# Patient Record
Sex: FEMALE | Race: WHITE | HISPANIC OR LATINO | URBAN - METROPOLITAN AREA
[De-identification: names, ages, dates, MRNs, and addresses within clinical notes are randomized per-mention and may not be internally consistent; named-entity substitution may affect disease eponyms.]

---

## 2018-01-02 ENCOUNTER — TELEPHONE (OUTPATIENT)
Dept: INTERNAL MEDICINE | Facility: CLINIC | Age: 38
End: 2018-01-02

## 2018-01-02 NOTE — TELEPHONE ENCOUNTER
Pt just hand an annual pap and has some concerns about pap done Feb. 2016 and would like to discuss the results of that again. Please call pt.

## 2018-01-02 NOTE — TELEPHONE ENCOUNTER
I spoke with pt who said that she just rec'd her pap smear results from  and they informed her that she is high risk HPV. Pt was calling to see if her pap smear is 2016 showed anything or if that was something that we screen for. Pt advised that her pap smear was completely normal and because of that, we did not test for HPV. We will test automatically if pap smear comes back abnormal or ASCUS. Pt states that  has started a screening for HPV in women over 30 but I have not heard that this is a requirement. Pt asked to call back if she has any additional questions.

## 2019-06-23 PROCEDURE — 87086 URINE CULTURE/COLONY COUNT: CPT | Performed by: NURSE PRACTITIONER

## 2019-06-23 PROCEDURE — 87186 SC STD MICRODIL/AGAR DIL: CPT | Performed by: NURSE PRACTITIONER

## 2019-10-02 PROCEDURE — 87086 URINE CULTURE/COLONY COUNT: CPT | Performed by: FAMILY MEDICINE

## 2019-10-17 ENCOUNTER — OFFICE VISIT (OUTPATIENT)
Dept: ENDOCRINOLOGY | Facility: CLINIC | Age: 39
End: 2019-10-17

## 2019-10-17 ENCOUNTER — TELEPHONE (OUTPATIENT)
Dept: ENDOCRINOLOGY | Facility: CLINIC | Age: 39
End: 2019-10-17

## 2019-10-17 VITALS
DIASTOLIC BLOOD PRESSURE: 60 MMHG | SYSTOLIC BLOOD PRESSURE: 104 MMHG | BODY MASS INDEX: 20.96 KG/M2 | WEIGHT: 130.4 LBS | OXYGEN SATURATION: 99 % | HEIGHT: 66 IN | HEART RATE: 77 BPM

## 2019-10-17 DIAGNOSIS — E13.9 LADA (LATENT AUTOIMMUNE DIABETES IN ADULTS), MANAGED AS TYPE 1 (HCC): Primary | ICD-10-CM

## 2019-10-17 LAB
GLUCOSE BLDC GLUCOMTR-MCNC: 113 MG/DL (ref 70–130)
HBA1C MFR BLD: 8.1 %
POC CREATININE URINE: 50
POC MICROALBUMIN URINE: 10

## 2019-10-17 PROCEDURE — 82570 ASSAY OF URINE CREATININE: CPT | Performed by: INTERNAL MEDICINE

## 2019-10-17 PROCEDURE — 82947 ASSAY GLUCOSE BLOOD QUANT: CPT | Performed by: INTERNAL MEDICINE

## 2019-10-17 PROCEDURE — 90471 IMMUNIZATION ADMIN: CPT | Performed by: INTERNAL MEDICINE

## 2019-10-17 PROCEDURE — 90686 IIV4 VACC NO PRSV 0.5 ML IM: CPT | Performed by: INTERNAL MEDICINE

## 2019-10-17 PROCEDURE — 83036 HEMOGLOBIN GLYCOSYLATED A1C: CPT | Performed by: INTERNAL MEDICINE

## 2019-10-17 PROCEDURE — 99204 OFFICE O/P NEW MOD 45 MIN: CPT | Performed by: INTERNAL MEDICINE

## 2019-10-17 PROCEDURE — 82044 UR ALBUMIN SEMIQUANTITATIVE: CPT | Performed by: INTERNAL MEDICINE

## 2019-10-17 RX ORDER — INSULIN LISPRO 100 [IU]/ML
2 INJECTION, SOLUTION INTRAVENOUS; SUBCUTANEOUS DAILY
COMMUNITY
Start: 2019-10-03 | End: 2019-10-17

## 2019-10-17 RX ORDER — INSULIN LISPRO 100 [IU]/ML
2 INJECTION, SOLUTION INTRAVENOUS; SUBCUTANEOUS
Status: SHIPPED
Start: 2019-10-17 | End: 2019-10-21 | Stop reason: SDUPTHER

## 2019-10-17 RX ORDER — BLOOD SUGAR DIAGNOSTIC
STRIP MISCELLANEOUS
COMMUNITY
Start: 2019-10-03 | End: 2022-04-22

## 2019-10-17 RX ORDER — FLASH GLUCOSE SENSOR
1 KIT MISCELLANEOUS
Qty: 3 EACH | Refills: 5 | Status: SHIPPED | OUTPATIENT
Start: 2019-10-17 | End: 2021-01-20

## 2019-10-17 NOTE — TELEPHONE ENCOUNTER
Received labs from [facility].  Gave records to Dr. March, whiled she was with the patient.      Requested recent labs from Dr. Crane,  [facility] at [phone].    Will fax to us.      ----- Message from Shanna March MD sent at 10/17/2019 11:22 AM EDT -----  Ms. Escalante,     Could you please contact Dr. Martinez's office and request results of recent labs ? (Drawn ~2 weeks ago) Specifically C-peptide, insulin, YANCY antibodies and any other available labs.    Thank you,     Shanna

## 2019-10-17 NOTE — PROGRESS NOTES
Chief Complaint   Patient presents with   • Establish Care   • Latent Autoimmune Diabetes     referred by Dr. TACO Crane        New patient who is being seen in consultation regarding recently diagnosed diabetes at the request of Shana Martinez MD     HPI   Candace Kinney is a 39 y.o. female who presents to discuss recent diagnosis of diabetes.    Patient has a history of possible LORI.  This was initially diagnosed a few weeks ago after patient presented with 5 days days of increased urination and increased thirst. She started azothioprine and then later presented to [facility] [place] due to concern for UTI at which time she was noted to have glucose of 390 with a corresponding A1c of 8.8..  Patient was initially started on metformin which was discontinued when she followed up with her PCP.  Patient is currently taking lantus 10 units in the afternoon. 2 units short acting insulin if >120 , typically takes 2 doses of short acting insulin daily.  This regimen was last changed 2 weeks ago.  Patient believes that glycemic control is improving.  Patient reports good adherence to medications.  She reports that she has felt better since starting insulin and that her increased thirst and increased urination are improving    Patient reports hypoglycemia awareness with symptoms of shaky if glucose falls below 100.  She does report having symptoms of hypoglycemia despite normal blood glucose but noticed that this is happening less frequently over the last week.  Patient monitors blood glucose three times daily.  Downloaded patient's meter reveals medium glucose 158, range 95-3 18 with no hypoglycemia.  She reports that hyperglycemia frequently occurs when she holds the dose of short acting insulin due to glucose being less than 120 mg.  Patient reports that since her diagnosis she is been trying to eat healthier and limit her simple sugars in her diet.  However she reports that she has not received a lot of information regarding what  diabetic diet entails and requests referral to diabetes education.  Patient does exercise regularly.     Patient has not had recent dilated eye exam.  Patient denies foot related concerns such as numbness, tingling, or pain.  History of dyslipidemia: No  History of renal dysfunction: No    She denies any family history of type 1 diabetes.    Past Medical History:   Diagnosis Date   • Anemia    • Depression    • Female bladder prolapse    • Hyperglycemia    • Lupus (CMS/HCC)    • Migraine      Past Surgical History:   Procedure Laterality Date   • HERNIA REPAIR     • TOOTH EXTRACTION        Family History   Problem Relation Age of Onset   • Arthritis Mother    • Cancer Mother    • Mental illness Sister    • Obesity Sister    • Hypertension Other    • Obesity Other    • Migraines Other    • Mental illness Father    • Migraines Father    • Arthritis Maternal Grandmother       Social History     Socioeconomic History   • Marital status:      Spouse name: Not on file   • Number of children: Not on file   • Years of education: Not on file   • Highest education level: Not on file   Tobacco Use   • Smoking status: Never Smoker   • Smokeless tobacco: Never Used   Substance and Sexual Activity   • Alcohol use: Yes     Alcohol/week: 1.2 oz     Types: 2 Glasses of wine per week     Comment: occasional   • Drug use: No   • Sexual activity: Defer      Allergies   Allergen Reactions   • Nitrofurantoin    • Sulfa Antibiotics       Current Outpatient Medications on File Prior to Visit   Medication Sig Dispense Refill   • drospirenone-ethinyl estradiol (MOE) 3-0.02 MG per tablet Take 1 tablet by mouth Daily.     • Insulin Glargine (BASAGLAR KWIKPEN SC) Inject 10 Units/oz/day under the skin into the appropriate area as directed Daily.     • ONETOUCH VERIO test strip      • [DISCONTINUED] ciprofloxacin (CIPRO) 250 MG tablet Take 1 tablet by mouth 2 (Two) Times a Day. 6 tablet 0   • [DISCONTINUED] Cranberry-Vitamin C-Probiotic  "(AZO CRANBERRY PO) Take  by mouth.     • [DISCONTINUED] HUMALOG KWIKPEN 100 UNIT/ML solution pen-injector Inject 2 Units under the skin into the appropriate area as directed Daily.     • [DISCONTINUED] metFORMIN (GLUCOPHAGE) 500 MG tablet Take 1 tablet by mouth 2 (Two) Times a Day With Meals. 30 tablet 0     No current facility-administered medications on file prior to visit.         Review of Systems   Constitutional: Positive for fatigue. Negative for unexpected weight gain.   HENT: Negative for trouble swallowing and voice change.    Respiratory: Negative for cough and shortness of breath.    Cardiovascular: Negative for chest pain and palpitations.   Gastrointestinal: Positive for constipation. Negative for abdominal pain, diarrhea and nausea.   Endocrine: Positive for polydipsia and polyuria.   Musculoskeletal: Negative for arthralgias and myalgias.   Skin: Positive for rash. Negative for dry skin.   Neurological: Negative for tremors and numbness.   Psychiatric/Behavioral: Negative for sleep disturbance and depressed mood.        Vitals:    10/17/19 1116   BP: 104/60   Pulse: 77   SpO2: 99%   Weight: 59.1 kg (130 lb 6.4 oz)   Height: 167.6 cm (66\")   Body mass index is 21.05 kg/m².     Physical Exam   Constitutional: Vital signs are normal. She appears well-developed and well-nourished. She is cooperative.   HENT:   Head: Normocephalic and atraumatic.   Mouth/Throat: Oropharynx is clear and moist and mucous membranes are normal.   Eyes: Conjunctivae and EOM are normal. Pupils are equal, round, and reactive to light.   Neck: Trachea normal. No thyromegaly present.   Cardiovascular: Normal rate and regular rhythm.   No murmur heard.  Pulmonary/Chest: Effort normal and breath sounds normal. No respiratory distress.   Abdominal: Soft. Bowel sounds are normal. She exhibits no distension. There is no hepatosplenomegaly. There is no tenderness.    Candace had a diabetic foot exam performed today.   During the foot " exam she had a monofilament test performed.    Neurological Sensory Findings -  Unaltered sharp/dull right ankle/foot discrimination and unaltered sharp/dull left ankle/foot discrimination.  Vascular Status -  Her right foot exhibits normal foot vasculature  and no edema. Her left foot exhibits normal foot vasculature  and no edema.  Lymphadenopathy:        Head (right side): No submandibular adenopathy present.        Head (left side): No submandibular adenopathy present.     She has no cervical adenopathy.   Neurological: She is alert. She has normal reflexes.   Skin: Skin is warm, dry and intact.   Patient with discoloration of anterior right shin which blanches    Psychiatric: She has a normal mood and affect. Her behavior is normal.       Labs/Imaging  Islet cell antibody screen positive  C-peptide 1.72, reference range 0.8-3.85  Insulin level 7.8, reference range 2-19.6  Glucose 307  EGFR greater than 60  Hemoglobin A1c 9.0  Glutamic acid decarboxylase 65 42, reference range less than 5    Assessment and Alex Maria was seen today for establish care and latent autoimmune diabetes.    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  -Patient's glucose log reveals morning glucose near goal with some rise in blood glucose over the course of the day.  -Patient to continue Lantus 10 units daily.  -Patient to continue Humalog 2 units 3 times daily.  She was instructed to take this before meals.  -Start Humalog correction scale, 1 unit for every 100 points greater than 200  -Written instructions for medications were provided to the patient.  -Patient inquired about freestyle ramiro system. Discussed that this is not as accurate as glucometer and patient should verify any values <70 or >250 with fingerstick and any time she is not feeling well.   - Hypoglycemia management was reviewed with the  patient.  -Refer for diabetes education.  -Recommendations for daily foot exams and annual eye exams reviewed with the patient.  -Microalbumin completed today  -Consider lipid panel with next labs.   -Monofilament exam completed today with intact sensation.  - Patient to schedule eye exam.  -     POC Glycosylated Hemoglobin (Hb A1C)  -     POCT Glucose  -     POCT microalbumin  -     Flucelvax Quad=>4Years (1195-9145)  -     Ambulatory Referral to Diabetic Education  -     Continuous Blood Gluc Sensor (FREESTYLE RAISSA 14 DAY SENSOR) misc; 1 each Every 14 (Fourteen) Days.  -     HUMALOG KWIKPEN 100 UNIT/ML solution pen-injector; Inject 2 Units under the skin into the appropriate area as directed 3 (Three) Times a Day With Meals.    Skin discoloration  -advised patient to discuss with PCP if not improving or worsening.         Follow up in 3 months. The patient was instructed to contact the clinic with any interval questions or concerns.    Shanna March MD     Please note that portions of this document were completed using a voice recognition program. Efforts were made to edit the dictations, but occasionally words are mis-transcribed.

## 2019-10-21 ENCOUNTER — TELEPHONE (OUTPATIENT)
Dept: INTERNAL MEDICINE | Facility: CLINIC | Age: 39
End: 2019-10-21

## 2019-10-21 DIAGNOSIS — E13.9 LADA (LATENT AUTOIMMUNE DIABETES IN ADULTS), MANAGED AS TYPE 1: ICD-10-CM

## 2019-10-21 RX ORDER — INSULIN LISPRO 100 [IU]/ML
2 INJECTION, SOLUTION INTRAVENOUS; SUBCUTANEOUS
Start: 2019-10-21 | End: 2023-05-12

## 2019-10-21 NOTE — TELEPHONE ENCOUNTER
NYA CALLED IN REGARDS TO LAST VISIT ON Thursday SHE THOUGHT A PRESCRIPTION WAS GOING TO BE CALLED IN AND WHEN SHE WENT TO THE PHARMACY THERE WAS NOT ONE CALLED IN.   PLEASE CALL [phone]

## 2019-10-21 NOTE — TELEPHONE ENCOUNTER
Patient states she went to her [facility] and they did not have her insulin pen.  Looked at the chart, looks like Dr. March sent the script for it on 10/17/19.  However, patient states the pharmacy did not have the script for it.    Re-sending med refill for insulin pen.        Left message for patient to return my call.  Need clarification on what medication patient is talking about, that we did not send to the pharmacy.

## 2019-11-01 ENCOUNTER — HOSPITAL ENCOUNTER (OUTPATIENT)
Dept: DIABETES SERVICES | Facility: HOSPITAL | Age: 39
Setting detail: RECURRING SERIES
Discharge: HOME | End: 2019-11-01

## 2019-11-01 PROCEDURE — G0108 DIAB MANAGE TRN  PER INDIV: HCPCS | Performed by: REGISTERED NURSE

## 2019-11-07 ENCOUNTER — TELEPHONE (OUTPATIENT)
Dept: INTERNAL MEDICINE | Facility: CLINIC | Age: 39
End: 2019-11-07

## 2019-11-07 NOTE — TELEPHONE ENCOUNTER
PLEASE CALL PT SHE NEEDS A LETTER FOR WORK STATING SHE HAS A MEDICAL CONDITION    PLEASE CALL [phone]

## 2019-11-08 DIAGNOSIS — E13.9 LADA (LATENT AUTOIMMUNE DIABETES IN ADULTS), MANAGED AS TYPE 1 (HCC): Primary | ICD-10-CM

## 2019-11-08 NOTE — TELEPHONE ENCOUNTER
Ms. Escalante,     Could you please contact the patient to obtain some further details on what is needed?

## 2019-11-08 NOTE — TELEPHONE ENCOUNTER
"Patient states she needs a note from Dr. March for work.  According to the patient, her boss told her she is not performing at the level she should, and maybe needs to not work or cut her ours from 40 hours a week to 35 hours.   Patient states she feels extremely fatigue and she thinks its due to the insulin and her medical condition - Diabetes.   Patient states that the symptoms that she has (extreme fatigue) is preventing her from performing well as a .    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    The patient's boss suggested that if she had a medical condition that is interfering with her job, the patient should talk to her doctor about getting a note for work.      Patient states there are days when she can barely get out of bed, and she is always extremely tired.  Patient states she started to feel very tired when she started on insulin, October 3, 2019 with Dr. Crane.  Patient's blood sugars were in the three hundreds, at the time of been diagnosed with diabetes.    Besides feeling extremely fatigue, patient states \" the other day I felt icky and my neck was hurting\" but patient states the extreme fatigue is what is bothering her.    Dr. March, please advice.  Thank you.      "

## 2019-11-08 NOTE — TELEPHONE ENCOUNTER
Please contact the patient and inquire about her recent glycemic control. While it is certainly possible to have fatigue associated with hyperglycemia and also to have some changes with the initial initiation of insulin, these should be improving as she has now been on insulin for several weeks and it is difficult for me to reconcile her profound symptoms with the initiation of insulin. I would, however, like to screen her for thyroid dysfunction (thyroid dysfunction can be autoimmune in nature, much like her diabetes, making it reasonable to screen for this, given that low thyroid hormone can cause fatigue). I will place the order for this.  However, if her glucose control is reasonable and her thyroid function is normal, she may need to see her PCP for evaluation of alternative causes of her symptoms.

## 2019-11-11 NOTE — TELEPHONE ENCOUNTER
Informed patient.  Patient voiced understanding.    However, patient states one day last week she got really nauseous out of no where and Saturday as well she got nauseous again. Patient had breakfast and lunch on Saturday and by the time she had dinner it was really hard for her to eat because she was still feeling nauseous.  Patient states that she does not know why she is now feeling nauseas, and so she would like to know what are her symptoms (extreme fatigue and now nausea) is related to, patient states she still think it might be related to her insulin use, but wants to know what Dr. March thinks.    Patient e-mail me her Daily Pattern and Average Glucose (October 13-November 11, 2019).  Patient also e-mail me a copy of recent TSH labs that patient states she had done, ordered by Dr. Crane.    I will forward this information to Dr. March for review.    Dr. March, please advice.  Thank you.

## 2019-11-11 NOTE — TELEPHONE ENCOUNTER
Please let the patient know that I have reviewed her labs and glucose data. Her BG control is quite reasonable and in the absence of the extremes of blood glucose, I cannot explain her recent nausea, especially given that she has been on insulin for over a month and just now developed these symptoms. However, she should certainly rule out hypoglycemia any time she is not feeling well. Likewise, given her normal thyroid function and reasonable blood glucose control I cannot easily explain her fatigue. I would recommend she follow up with her PCP for evaluation of other potential causes of her symptoms.

## 2019-11-11 NOTE — TELEPHONE ENCOUNTER
"Informed patient, patient voiced understanding.    However patient has some questions for Dr. March.    Can the her symptoms - Nausea and extreme fatigue be due to her still adjusting to the insulin.  Patient states that she knows that it has been more than a month that she has been on insulin but \"maybe my body still adjusting to the insulin?\"    Patient also states that her maybe because \"I feel like I am much lower (blood sugars) than what I am actually are, maybe this has something to do with it.\" her symptoms of nausea and extreme fatigue.    Dr. March, please advice.  Thank you.      Call patient's cell number, left message for patient to return my call.  "

## 2019-11-12 NOTE — TELEPHONE ENCOUNTER
Please contact the patient and let her know that I do not believe that her symptoms are related to her insulin or blood sugars adjusting after initiation of insulin. I would have expected any symptoms related to the change in her blood sugar to have resolved by now and this would generally be symptoms of hypoglycemia (shaking, sweating, anxiety, etc.) and not the symptoms she is describing.    Again, I would advise her to see her PCP for further evaluation of her symptoms.

## 2019-12-06 ENCOUNTER — HOSPITAL ENCOUNTER (OUTPATIENT)
Dept: DIABETES SERVICES | Facility: HOSPITAL | Age: 39
Setting detail: RECURRING SERIES
Discharge: HOME | End: 2019-12-06

## 2019-12-10 ENCOUNTER — HOSPITAL ENCOUNTER (OUTPATIENT)
Dept: DIABETES SERVICES | Facility: HOSPITAL | Age: 39
Setting detail: RECURRING SERIES
Discharge: HOME | End: 2019-12-10

## 2020-02-12 ENCOUNTER — HOSPITAL ENCOUNTER (EMERGENCY)
Facility: HOSPITAL | Age: 40
Discharge: HOME | End: 2020-02-12
Attending: EMERGENCY MEDICINE | Admitting: EMERGENCY MEDICINE

## 2020-02-12 ENCOUNTER — APPOINTMENT (OUTPATIENT)
Dept: CT IMAGING | Facility: HOSPITAL | Age: 40
End: 2020-02-12

## 2020-02-12 ENCOUNTER — TRANSCRIBE ORDERS (OUTPATIENT)
Dept: ADMINISTRATIVE | Facility: HOSPITAL | Age: 40
End: 2020-02-12

## 2020-02-12 ENCOUNTER — LAB REQUISITION (OUTPATIENT)
Dept: LAB | Facility: HOSPITAL | Age: 40
End: 2020-02-12

## 2020-02-12 ENCOUNTER — HOSPITAL ENCOUNTER (OUTPATIENT)
Dept: CT IMAGING | Facility: HOSPITAL | Age: 40
Discharge: HOME | End: 2020-02-12

## 2020-02-12 VITALS
OXYGEN SATURATION: 97 % | BODY MASS INDEX: 16.07 KG/M2 | TEMPERATURE: 99.1 F | HEART RATE: 86 BPM | WEIGHT: 100 LBS | DIASTOLIC BLOOD PRESSURE: 61 MMHG | SYSTOLIC BLOOD PRESSURE: 112 MMHG | RESPIRATION RATE: 14 BRPM | HEIGHT: 66 IN

## 2020-02-12 DIAGNOSIS — K92.1 HEMATOCHEZIA: Primary | ICD-10-CM

## 2020-02-12 DIAGNOSIS — R10.84 GENERALIZED ABDOMINAL CRAMPING: Primary | ICD-10-CM

## 2020-02-12 DIAGNOSIS — K62.5 BRBPR (BRIGHT RED BLOOD PER RECTUM): ICD-10-CM

## 2020-02-12 DIAGNOSIS — Z00.00 ROUTINE GENERAL MEDICAL EXAMINATION AT A HEALTH CARE FACILITY: ICD-10-CM

## 2020-02-12 DIAGNOSIS — E10.9 AUTOIMMUNE DIABETES (HCC): ICD-10-CM

## 2020-02-12 LAB
ALBUMIN SERPL-MCNC: 4.2 G/DL (ref 3.5–5.2)
ALBUMIN SERPL-MCNC: 5 G/DL (ref 3.5–5.2)
ALBUMIN/GLOB SERPL: 1.4 G/DL
ALBUMIN/GLOB SERPL: 1.6 G/DL
ALP SERPL-CCNC: 52 U/L (ref 39–117)
ALP SERPL-CCNC: 64 U/L (ref 39–117)
ALT SERPL W P-5'-P-CCNC: 6 U/L (ref 1–33)
ALT SERPL W P-5'-P-CCNC: 9 U/L (ref 1–33)
ANION GAP SERPL CALCULATED.3IONS-SCNC: 14 MMOL/L (ref 5–15)
ANION GAP SERPL CALCULATED.3IONS-SCNC: 16 MMOL/L (ref 5–15)
AST SERPL-CCNC: 11 U/L (ref 1–32)
AST SERPL-CCNC: 12 U/L (ref 1–32)
BACTERIA UR QL AUTO: ABNORMAL /HPF
BASOPHILS # BLD AUTO: 0.04 10*3/MM3 (ref 0–0.2)
BASOPHILS # BLD AUTO: 0.04 10*3/MM3 (ref 0–0.2)
BASOPHILS NFR BLD AUTO: 0.4 % (ref 0–1.5)
BASOPHILS NFR BLD AUTO: 0.4 % (ref 0–1.5)
BILIRUB SERPL-MCNC: 0.4 MG/DL (ref 0.2–1.2)
BILIRUB SERPL-MCNC: 0.5 MG/DL (ref 0.2–1.2)
BILIRUB UR QL STRIP: NEGATIVE
BUN BLD-MCNC: 8 MG/DL (ref 6–20)
BUN BLD-MCNC: 9 MG/DL (ref 6–20)
BUN/CREAT SERPL: 11.8 (ref 7–25)
BUN/CREAT SERPL: 12 (ref 7–25)
CALCIUM SPEC-SCNC: 10.1 MG/DL (ref 8.6–10.5)
CALCIUM SPEC-SCNC: 9.1 MG/DL (ref 8.6–10.5)
CHLORIDE SERPL-SCNC: 101 MMOL/L (ref 98–107)
CHLORIDE SERPL-SCNC: 97 MMOL/L (ref 98–107)
CLARITY UR: ABNORMAL
CO2 SERPL-SCNC: 24 MMOL/L (ref 22–29)
CO2 SERPL-SCNC: 25 MMOL/L (ref 22–29)
COLOR UR: YELLOW
CREAT BLD-MCNC: 0.68 MG/DL (ref 0.57–1)
CREAT BLD-MCNC: 0.75 MG/DL (ref 0.57–1)
DEPRECATED RDW RBC AUTO: 37.1 FL (ref 37–54)
DEPRECATED RDW RBC AUTO: 37.7 FL (ref 37–54)
EOSINOPHIL # BLD AUTO: 0 10*3/MM3 (ref 0–0.4)
EOSINOPHIL # BLD AUTO: 0 10*3/MM3 (ref 0–0.4)
EOSINOPHIL NFR BLD AUTO: 0 % (ref 0.3–6.2)
EOSINOPHIL NFR BLD AUTO: 0 % (ref 0.3–6.2)
ERYTHROCYTE [DISTWIDTH] IN BLOOD BY AUTOMATED COUNT: 11.6 % (ref 12.3–15.4)
ERYTHROCYTE [DISTWIDTH] IN BLOOD BY AUTOMATED COUNT: 11.7 % (ref 12.3–15.4)
GFR SERPL CREATININE-BSD FRML MDRD: 86 ML/MIN/1.73
GFR SERPL CREATININE-BSD FRML MDRD: 96 ML/MIN/1.73
GLOBULIN UR ELPH-MCNC: 3 GM/DL
GLOBULIN UR ELPH-MCNC: 3.2 GM/DL
GLUCOSE BLD-MCNC: 125 MG/DL (ref 65–99)
GLUCOSE BLD-MCNC: 145 MG/DL (ref 65–99)
GLUCOSE UR STRIP-MCNC: NEGATIVE MG/DL
HCG INTACT+B SERPL-ACNC: <0.5 MIU/ML
HCT VFR BLD AUTO: 35.7 % (ref 34–46.6)
HCT VFR BLD AUTO: 42.6 % (ref 34–46.6)
HGB BLD-MCNC: 12.7 G/DL (ref 12–15.9)
HGB BLD-MCNC: 14.5 G/DL (ref 12–15.9)
HGB UR QL STRIP.AUTO: NEGATIVE
HYALINE CASTS UR QL AUTO: ABNORMAL /LPF
IMM GRANULOCYTES # BLD AUTO: 0.02 10*3/MM3 (ref 0–0.05)
IMM GRANULOCYTES # BLD AUTO: 0.03 10*3/MM3 (ref 0–0.05)
IMM GRANULOCYTES NFR BLD AUTO: 0.2 % (ref 0–0.5)
IMM GRANULOCYTES NFR BLD AUTO: 0.3 % (ref 0–0.5)
KETONES UR QL STRIP: ABNORMAL
LEUKOCYTE ESTERASE UR QL STRIP.AUTO: NEGATIVE
LIPASE SERPL-CCNC: 13 U/L (ref 13–60)
LYMPHOCYTES # BLD AUTO: 0.78 10*3/MM3 (ref 0.7–3.1)
LYMPHOCYTES # BLD AUTO: 0.91 10*3/MM3 (ref 0.7–3.1)
LYMPHOCYTES NFR BLD AUTO: 8 % (ref 19.6–45.3)
LYMPHOCYTES NFR BLD AUTO: 9.5 % (ref 19.6–45.3)
MCH RBC QN AUTO: 30.3 PG (ref 26.6–33)
MCH RBC QN AUTO: 31.1 PG (ref 26.6–33)
MCHC RBC AUTO-ENTMCNC: 34 G/DL (ref 31.5–35.7)
MCHC RBC AUTO-ENTMCNC: 35.6 G/DL (ref 31.5–35.7)
MCV RBC AUTO: 87.3 FL (ref 79–97)
MCV RBC AUTO: 88.9 FL (ref 79–97)
MONOCYTES # BLD AUTO: 0.26 10*3/MM3 (ref 0.1–0.9)
MONOCYTES # BLD AUTO: 0.31 10*3/MM3 (ref 0.1–0.9)
MONOCYTES NFR BLD AUTO: 2.7 % (ref 5–12)
MONOCYTES NFR BLD AUTO: 3.2 % (ref 5–12)
NEUTROPHILS # BLD AUTO: 8.31 10*3/MM3 (ref 1.7–7)
NEUTROPHILS # BLD AUTO: 8.65 10*3/MM3 (ref 1.7–7)
NEUTROPHILS NFR BLD AUTO: 86.7 % (ref 42.7–76)
NEUTROPHILS NFR BLD AUTO: 88.6 % (ref 42.7–76)
NITRITE UR QL STRIP: NEGATIVE
NRBC BLD AUTO-RTO: 0 /100 WBC (ref 0–0.2)
NRBC BLD AUTO-RTO: 0 /100 WBC (ref 0–0.2)
PH UR STRIP.AUTO: 5.5 [PH] (ref 5–8)
PLATELET # BLD AUTO: 301 10*3/MM3 (ref 140–450)
PLATELET # BLD AUTO: 345 10*3/MM3 (ref 140–450)
PMV BLD AUTO: 10.2 FL (ref 6–12)
PMV BLD AUTO: 10.9 FL (ref 6–12)
POTASSIUM BLD-SCNC: 3.7 MMOL/L (ref 3.5–5.2)
POTASSIUM BLD-SCNC: 4.1 MMOL/L (ref 3.5–5.2)
PROT SERPL-MCNC: 7.2 G/DL (ref 6–8.5)
PROT SERPL-MCNC: 8.2 G/DL (ref 6–8.5)
PROT UR QL STRIP: NEGATIVE
RBC # BLD AUTO: 4.09 10*6/MM3 (ref 3.77–5.28)
RBC # BLD AUTO: 4.79 10*6/MM3 (ref 3.77–5.28)
RBC # UR: ABNORMAL /HPF
REF LAB TEST METHOD: ABNORMAL
SODIUM BLD-SCNC: 138 MMOL/L (ref 136–145)
SODIUM BLD-SCNC: 139 MMOL/L (ref 136–145)
SP GR UR STRIP: 1.02 (ref 1–1.03)
SQUAMOUS #/AREA URNS HPF: ABNORMAL /HPF
UROBILINOGEN UR QL STRIP: ABNORMAL
WBC NRBC COR # BLD: 9.59 10*3/MM3 (ref 3.4–10.8)
WBC NRBC COR # BLD: 9.76 10*3/MM3 (ref 3.4–10.8)
WBC UR QL AUTO: ABNORMAL /HPF

## 2020-02-12 PROCEDURE — 80053 COMPREHEN METABOLIC PANEL: CPT

## 2020-02-12 PROCEDURE — 83690 ASSAY OF LIPASE: CPT | Performed by: EMERGENCY MEDICINE

## 2020-02-12 PROCEDURE — 84702 CHORIONIC GONADOTROPIN TEST: CPT | Performed by: EMERGENCY MEDICINE

## 2020-02-12 PROCEDURE — 99284 EMERGENCY DEPT VISIT MOD MDM: CPT

## 2020-02-12 PROCEDURE — 85025 COMPLETE CBC W/AUTO DIFF WBC: CPT | Performed by: EMERGENCY MEDICINE

## 2020-02-12 PROCEDURE — 25010000002 IOPAMIDOL 61 % SOLUTION: Performed by: EMERGENCY MEDICINE

## 2020-02-12 PROCEDURE — 74177 CT ABD & PELVIS W/CONTRAST: CPT

## 2020-02-12 PROCEDURE — 96374 THER/PROPH/DIAG INJ IV PUSH: CPT

## 2020-02-12 PROCEDURE — 25010000002 KETOROLAC TROMETHAMINE PER 15 MG: Performed by: EMERGENCY MEDICINE

## 2020-02-12 PROCEDURE — 85025 COMPLETE CBC W/AUTO DIFF WBC: CPT

## 2020-02-12 PROCEDURE — 80053 COMPREHEN METABOLIC PANEL: CPT | Performed by: EMERGENCY MEDICINE

## 2020-02-12 PROCEDURE — 81001 URINALYSIS AUTO W/SCOPE: CPT | Performed by: EMERGENCY MEDICINE

## 2020-02-12 RX ORDER — KETOROLAC TROMETHAMINE 15 MG/ML
10 INJECTION, SOLUTION INTRAMUSCULAR; INTRAVENOUS ONCE
Status: COMPLETED | OUTPATIENT
Start: 2020-02-12 | End: 2020-02-12

## 2020-02-12 RX ORDER — DICYCLOMINE HYDROCHLORIDE 10 MG/1
20 CAPSULE ORAL ONCE
Status: COMPLETED | OUTPATIENT
Start: 2020-02-12 | End: 2020-02-12

## 2020-02-12 RX ORDER — SODIUM CHLORIDE 0.9 % (FLUSH) 0.9 %
10 SYRINGE (ML) INJECTION AS NEEDED
Status: DISCONTINUED | OUTPATIENT
Start: 2020-02-12 | End: 2020-02-13 | Stop reason: HOSPADM

## 2020-02-12 RX ORDER — ONDANSETRON 2 MG/ML
4 INJECTION INTRAMUSCULAR; INTRAVENOUS ONCE
Status: DISCONTINUED | OUTPATIENT
Start: 2020-02-12 | End: 2020-02-13 | Stop reason: HOSPADM

## 2020-02-12 RX ORDER — DOXYCYCLINE HYCLATE 100 MG/1
100 CAPSULE ORAL 2 TIMES DAILY
COMMUNITY
End: 2021-01-20

## 2020-02-12 RX ADMIN — IOPAMIDOL 80 ML: 612 INJECTION, SOLUTION INTRAVENOUS at 21:07

## 2020-02-12 RX ADMIN — SODIUM CHLORIDE, PRESERVATIVE FREE 10 ML: 5 INJECTION INTRAVENOUS at 20:10

## 2020-02-12 RX ADMIN — SODIUM CHLORIDE, POTASSIUM CHLORIDE, SODIUM LACTATE AND CALCIUM CHLORIDE 1000 ML: 600; 310; 30; 20 INJECTION, SOLUTION INTRAVENOUS at 20:17

## 2020-02-12 RX ADMIN — KETOROLAC TROMETHAMINE 10 MG: 15 INJECTION, SOLUTION INTRAMUSCULAR; INTRAVENOUS at 20:20

## 2020-02-12 RX ADMIN — DICYCLOMINE HYDROCHLORIDE 20 MG: 10 CAPSULE ORAL at 20:21

## 2020-02-12 NOTE — ED PROVIDER NOTES
Subjective   Candace Kinney is a 39 y.o. female who presents to the ED with complaints of abdominal pain. The patient reports this morning, she woke up with lower abdominal cramping. She states she was going to the bathroom when she experienced a near syncopal episode and fell onto the floor. She did not lose consciousness. She states that she was also experiencing nausea, diaphoresis, chills, lightheadedness, and diarrhea. She noticed later in the day today that after a bowel movement, there was blood in the toilet. She takes birth control pills and states that she has been experiencing light spotting the past couple of days. . She denies vomiting, fever, chest pain, and shortness of breath. She has a history of type 1 diabetes and was diagnosed in 2019. She also has a history of lupus. There are no other acute complaints at this time.       History provided by:  Patient  Abdominal Pain   Pain location:  Suprapubic  Pain radiates to:  Does not radiate  Pain severity:  Moderate  Onset quality:  Sudden  Timing:  Intermittent  Chronicity:  New  Associated symptoms: chills, diarrhea and nausea    Associated symptoms: no chest pain, no fever, no shortness of breath and no vomiting        Review of Systems   Constitutional: Positive for chills and diaphoresis. Negative for fever.   Respiratory: Negative for shortness of breath.    Cardiovascular: Negative for chest pain.   Gastrointestinal: Positive for abdominal pain, blood in stool, diarrhea and nausea. Negative for vomiting.   Neurological: Positive for light-headedness.   All other systems reviewed and are negative.      Past Medical History:   Diagnosis Date   • Anemia    • Depression    • Female bladder prolapse    • Hyperglycemia    • Lupus (CMS/HCC)    • Migraine        Allergies   Allergen Reactions   • Nitrofurantoin    • Sulfa Antibiotics        Past Surgical History:   Procedure Laterality Date   • HERNIA REPAIR     • TOOTH EXTRACTION          Family History   Problem Relation Age of Onset   • Arthritis Mother    • Cancer Mother    • Mental illness Sister    • Obesity Sister    • Hypertension Other    • Obesity Other    • Migraines Other    • Mental illness Father    • Migraines Father    • Arthritis Maternal Grandmother        Social History     Socioeconomic History   • Marital status:      Spouse name: Not on file   • Number of children: Not on file   • Years of education: Not on file   • Highest education level: Not on file   Tobacco Use   • Smoking status: Never Smoker   • Smokeless tobacco: Never Used   Substance and Sexual Activity   • Alcohol use: Yes     Alcohol/week: 2.0 standard drinks     Types: 2 Glasses of wine per week     Comment: occasional   • Drug use: No   • Sexual activity: Defer         Objective   Physical Exam   Constitutional: She is oriented to person, place, and time. She appears well-developed and well-nourished.   HENT:   Head: Normocephalic and atraumatic.   Eyes: Conjunctivae are normal. No scleral icterus.   Neck: Normal range of motion. Neck supple.   Cardiovascular: Normal rate, regular rhythm and normal heart sounds. Exam reveals no gallop and no friction rub.   No murmur heard.  Pulmonary/Chest: Effort normal and breath sounds normal.   Abdominal: Soft. There is tenderness.   Right and left lower quadrant tenderness to palpation.    Musculoskeletal: Normal range of motion.   Neurological: She is alert and oriented to person, place, and time.   Skin: Skin is warm and dry.   Psychiatric: She has a normal mood and affect. Her behavior is normal.   Nursing note and vitals reviewed.      Procedures         ED Course  ED Course as of Feb 12 2348 Wed Feb 12, 2020 2044 I discussed the case with the patient's primary care physician who reviewed the patient's history and plan for this evening.  She does state the patient does have a follow-up appointment with gastroenterology tomorrow morning.  She also advised  on some other tests they were going to perform.  We will try to get several of those done here including some stool studies.    [RS]   2213 Dr. Lanza is at the bedside updating the patient on results and discussing the plan.     [JE]   2215 Patient reports she is only had 1 bowel movement today and it was this morning.  She has had no further bowel movements throughout the day making C. difficile colitis very unlikely.  We will discharge the patient home to follow-up with her scheduled appointment tomorrow with gastroenterology.  Patient has remained stable and nontoxic throughout the ER course. I had a discussion with the patient/family regarding diagnosis, diagnostic results, treatment plan, and medications.  The patient/family indicated understanding of these instructions.  I spent adequate time at the bedside proceeding discharge necessary to personally discuss the aftercare instructions, giving patient education, providing explanations of the results of our evaluations/findings, and my decision making to assure that the patient/family understand the plan of care.  Time was allotted to answer questions at that time and throughout the ED course.  Emphasis was placed on timely follow-up after discharge.  I also discussed the potential for the development of an acute emergent condition requiring further evaluation, admission, or even surgical intervention. I discussed that we found nothing during the visit today indicating the need for further workup, admission, or the presence of an unstable medical condition.  I encouraged the patient to return to the emergency department immediately for ANY concerns, worsening, new complaints, or if symptoms persist and unable to seek follow-up in a timely fashion.  The patient/family expressed understanding and agreement with this plan.     [RS]      ED Course User Index  [JE] Carmela Krueger  [RS] Scott Lanza MD           Recent Results (from the past 24  hour(s))   Comprehensive Metabolic Panel    Collection Time: 02/12/20  3:24 PM   Result Value Ref Range    Glucose 145 (H) 65 - 99 mg/dL    BUN 9 6 - 20 mg/dL    Creatinine 0.75 0.57 - 1.00 mg/dL    Sodium 138 136 - 145 mmol/L    Potassium 4.1 3.5 - 5.2 mmol/L    Chloride 97 (L) 98 - 107 mmol/L    CO2 25.0 22.0 - 29.0 mmol/L    Calcium 10.1 8.6 - 10.5 mg/dL    Total Protein 8.2 6.0 - 8.5 g/dL    Albumin 5.00 3.50 - 5.20 g/dL    ALT (SGPT) 9 1 - 33 U/L    AST (SGOT) 12 1 - 32 U/L    Alkaline Phosphatase 64 39 - 117 U/L    Total Bilirubin 0.5 0.2 - 1.2 mg/dL    eGFR Non African Amer 86 >60 mL/min/1.73    Globulin 3.2 gm/dL    A/G Ratio 1.6 g/dL    BUN/Creatinine Ratio 12.0 7.0 - 25.0    Anion Gap 16.0 (H) 5.0 - 15.0 mmol/L   CBC Auto Differential    Collection Time: 02/12/20  3:24 PM   Result Value Ref Range    WBC 9.76 3.40 - 10.80 10*3/mm3    RBC 4.79 3.77 - 5.28 10*6/mm3    Hemoglobin 14.5 12.0 - 15.9 g/dL    Hematocrit 42.6 34.0 - 46.6 %    MCV 88.9 79.0 - 97.0 fL    MCH 30.3 26.6 - 33.0 pg    MCHC 34.0 31.5 - 35.7 g/dL    RDW 11.7 (L) 12.3 - 15.4 %    RDW-SD 37.7 37.0 - 54.0 fl    MPV 10.9 6.0 - 12.0 fL    Platelets 345 140 - 450 10*3/mm3    Neutrophil % 88.6 (H) 42.7 - 76.0 %    Lymphocyte % 8.0 (L) 19.6 - 45.3 %    Monocyte % 2.7 (L) 5.0 - 12.0 %    Eosinophil % 0.0 (L) 0.3 - 6.2 %    Basophil % 0.4 0.0 - 1.5 %    Immature Grans % 0.3 0.0 - 0.5 %    Neutrophils, Absolute 8.65 (H) 1.70 - 7.00 10*3/mm3    Lymphocytes, Absolute 0.78 0.70 - 3.10 10*3/mm3    Monocytes, Absolute 0.26 0.10 - 0.90 10*3/mm3    Eosinophils, Absolute 0.00 0.00 - 0.40 10*3/mm3    Basophils, Absolute 0.04 0.00 - 0.20 10*3/mm3    Immature Grans, Absolute 0.03 0.00 - 0.05 10*3/mm3    nRBC 0.0 0.0 - 0.2 /100 WBC   Comprehensive Metabolic Panel    Collection Time: 02/12/20  8:01 PM   Result Value Ref Range    Glucose 125 (H) 65 - 99 mg/dL    BUN 8 6 - 20 mg/dL    Creatinine 0.68 0.57 - 1.00 mg/dL    Sodium 139 136 - 145 mmol/L    Potassium 3.7  3.5 - 5.2 mmol/L    Chloride 101 98 - 107 mmol/L    CO2 24.0 22.0 - 29.0 mmol/L    Calcium 9.1 8.6 - 10.5 mg/dL    Total Protein 7.2 6.0 - 8.5 g/dL    Albumin 4.20 3.50 - 5.20 g/dL    ALT (SGPT) 6 1 - 33 U/L    AST (SGOT) 11 1 - 32 U/L    Alkaline Phosphatase 52 39 - 117 U/L    Total Bilirubin 0.4 0.2 - 1.2 mg/dL    eGFR Non African Amer 96 >60 mL/min/1.73    Globulin 3.0 gm/dL    A/G Ratio 1.4 g/dL    BUN/Creatinine Ratio 11.8 7.0 - 25.0    Anion Gap 14.0 5.0 - 15.0 mmol/L   Lipase    Collection Time: 02/12/20  8:01 PM   Result Value Ref Range    Lipase 13 13 - 60 U/L   Urinalysis With Microscopic If Indicated (No Culture) - Urine, Clean Catch    Collection Time: 02/12/20  8:01 PM   Result Value Ref Range    Color, UA Yellow Yellow, Straw    Appearance, UA Cloudy (A) Clear    pH, UA 5.5 5.0 - 8.0    Specific Gravity, UA 1.022 1.001 - 1.030    Glucose, UA Negative Negative    Ketones, UA 15 mg/dL (1+) (A) Negative    Bilirubin, UA Negative Negative    Blood, UA Negative Negative    Protein, UA Negative Negative    Leuk Esterase, UA Negative Negative    Nitrite, UA Negative Negative    Urobilinogen, UA 0.2 E.U./dL 0.2 - 1.0 E.U./dL   CBC Auto Differential    Collection Time: 02/12/20  8:01 PM   Result Value Ref Range    WBC 9.59 3.40 - 10.80 10*3/mm3    RBC 4.09 3.77 - 5.28 10*6/mm3    Hemoglobin 12.7 12.0 - 15.9 g/dL    Hematocrit 35.7 34.0 - 46.6 %    MCV 87.3 79.0 - 97.0 fL    MCH 31.1 26.6 - 33.0 pg    MCHC 35.6 31.5 - 35.7 g/dL    RDW 11.6 (L) 12.3 - 15.4 %    RDW-SD 37.1 37.0 - 54.0 fl    MPV 10.2 6.0 - 12.0 fL    Platelets 301 140 - 450 10*3/mm3    Neutrophil % 86.7 (H) 42.7 - 76.0 %    Lymphocyte % 9.5 (L) 19.6 - 45.3 %    Monocyte % 3.2 (L) 5.0 - 12.0 %    Eosinophil % 0.0 (L) 0.3 - 6.2 %    Basophil % 0.4 0.0 - 1.5 %    Immature Grans % 0.2 0.0 - 0.5 %    Neutrophils, Absolute 8.31 (H) 1.70 - 7.00 10*3/mm3    Lymphocytes, Absolute 0.91 0.70 - 3.10 10*3/mm3    Monocytes, Absolute 0.31 0.10 - 0.90 10*3/mm3     Eosinophils, Absolute 0.00 0.00 - 0.40 10*3/mm3    Basophils, Absolute 0.04 0.00 - 0.20 10*3/mm3    Immature Grans, Absolute 0.02 0.00 - 0.05 10*3/mm3    nRBC 0.0 0.0 - 0.2 /100 WBC   Urinalysis, Microscopic Only - Urine, Clean Catch    Collection Time: 02/12/20  8:01 PM   Result Value Ref Range    RBC, UA 0-2 None Seen, 0-2 /HPF    WBC, UA 3-5 (A) None Seen, 0-2 /HPF    Bacteria, UA None Seen None Seen, Trace /HPF    Squamous Epithelial Cells, UA 0-2 None Seen, 0-2 /HPF    Hyaline Casts, UA 0-6 0 - 6 /LPF    Methodology Automated Microscopy    hCG, Quantitative, Pregnancy    Collection Time: 02/12/20  8:01 PM   Result Value Ref Range    HCG Quantitative <0.50 mIU/mL     Note: In addition to lab results from this visit, the labs listed above may include labs taken at another facility or during a different encounter within the last 24 hours. Please correlate lab times with ED admission and discharge times for further clarification of the services performed during this visit.    CT Abdomen Pelvis With Contrast   Final Result   Right renal cyst. Otherwise negative CT scan of the abdomen and pelvis.               Signer Name: Leonard Davalos MD    Signed: 2/12/2020 10:04 PM    Workstation Name: [id number]     [facility]        Vitals:    02/12/20 2130 02/12/20 2230 02/12/20 2236 02/12/20 2300   BP: 110/69 108/62  112/61   BP Location:       Patient Position:       Pulse: 86      Resp: 14      Temp:       TempSrc:       SpO2: 100%  99% 97%   Weight:       Height:         Medications   sodium chloride 0.9 % flush 10 mL (10 mL Intravenous Given 2/12/20 2010)   ondansetron (ZOFRAN) injection 4 mg (0 mg Intravenous Hold 2/12/20 2020)   lactated ringers bolus 1,000 mL (0 mL Intravenous Stopped 2/12/20 2300)   ketorolac (TORADOL) injection 10 mg (10 mg Intravenous Given 2/12/20 2020)   dicyclomine (BENTYL) capsule 20 mg (20 mg Oral Given 2/12/20 2021)   iopamidol (ISOVUE-300) 61 % injection 100 mL (80 mL  Intravenous Given 2/12/20 2107)     ECG/EMG Results (last 24 hours)     ** No results found for the last 24 hours. **        No orders to display                                               MDM  Number of Diagnoses or Management Options  Autoimmune diabetes (CMS/HCC):   BRBPR (bright red blood per rectum):   Generalized abdominal cramping:      Amount and/or Complexity of Data Reviewed  Clinical lab tests: reviewed  Tests in the radiology section of CPT®: reviewed  Discuss the patient with other providers: yes  Independent visualization of images, tracings, or specimens: yes        Final diagnoses:   Generalized abdominal cramping   BRBPR (bright red blood per rectum)   Autoimmune diabetes (CMS/HCC)       Documentation assistance provided by odessa Krueger.  Information recorded by the scribe was done at my direction and has been verified and validated by me.     Carmela Krueger  02/12/20 9040       Carmela Krueger  02/12/20 1930       Scott Lanza MD  02/12/20 2091

## 2020-11-09 PROCEDURE — 87086 URINE CULTURE/COLONY COUNT: CPT | Performed by: FAMILY MEDICINE

## 2020-11-09 PROCEDURE — 87088 URINE BACTERIA CULTURE: CPT | Performed by: FAMILY MEDICINE

## 2020-11-09 PROCEDURE — 87186 SC STD MICRODIL/AGAR DIL: CPT | Performed by: FAMILY MEDICINE

## 2021-01-20 ENCOUNTER — OFFICE VISIT (OUTPATIENT)
Dept: FAMILY MEDICINE CLINIC | Facility: CLINIC | Age: 41
End: 2021-01-20

## 2021-01-20 VITALS
HEIGHT: 66 IN | SYSTOLIC BLOOD PRESSURE: 115 MMHG | WEIGHT: 130 LBS | BODY MASS INDEX: 20.89 KG/M2 | HEART RATE: 78 BPM | TEMPERATURE: 97.6 F | DIASTOLIC BLOOD PRESSURE: 60 MMHG | OXYGEN SATURATION: 99 %

## 2021-01-20 DIAGNOSIS — M32.9 LUPUS (HCC): ICD-10-CM

## 2021-01-20 DIAGNOSIS — Z11.59 ENCOUNTER FOR HEPATITIS C SCREENING TEST FOR LOW RISK PATIENT: ICD-10-CM

## 2021-01-20 DIAGNOSIS — Z13.220 SCREENING FOR CHOLESTEROL LEVEL: ICD-10-CM

## 2021-01-20 DIAGNOSIS — Z13.0 SCREENING FOR DEFICIENCY ANEMIA: ICD-10-CM

## 2021-01-20 DIAGNOSIS — R00.2 PALPITATIONS: ICD-10-CM

## 2021-01-20 DIAGNOSIS — E53.8 VITAMIN B12 DEFICIENCY: ICD-10-CM

## 2021-01-20 DIAGNOSIS — E13.9 LADA (LATENT AUTOIMMUNE DIABETES IN ADULTS), MANAGED AS TYPE 1 (HCC): ICD-10-CM

## 2021-01-20 DIAGNOSIS — E55.9 VITAMIN D DEFICIENCY: ICD-10-CM

## 2021-01-20 DIAGNOSIS — Z76.89 ENCOUNTER TO ESTABLISH CARE: Primary | ICD-10-CM

## 2021-01-20 DIAGNOSIS — Z13.29 SCREENING FOR THYROID DISORDER: ICD-10-CM

## 2021-01-20 DIAGNOSIS — Z13.1 SCREENING FOR DIABETES MELLITUS: ICD-10-CM

## 2021-01-20 DIAGNOSIS — Z30.41 ENCOUNTER FOR SURVEILLANCE OF CONTRACEPTIVE PILLS: ICD-10-CM

## 2021-01-20 DIAGNOSIS — Z12.31 ENCOUNTER FOR SCREENING MAMMOGRAM FOR MALIGNANT NEOPLASM OF BREAST: ICD-10-CM

## 2021-01-20 PROBLEM — E11.9 DIABETES MELLITUS (HCC): Status: ACTIVE | Noted: 2021-01-20

## 2021-01-20 PROCEDURE — 99204 OFFICE O/P NEW MOD 45 MIN: CPT | Performed by: PHYSICIAN ASSISTANT

## 2021-01-20 RX ORDER — FLURBIPROFEN SODIUM 0.3 MG/ML
SOLUTION/ DROPS OPHTHALMIC
COMMUNITY
Start: 2020-11-26

## 2021-01-20 RX ORDER — DROSPIRENONE AND ETHINYL ESTRADIOL 0.02-3(28)
1 KIT ORAL DAILY
Qty: 28 TABLET | Refills: 5 | Status: SHIPPED | OUTPATIENT
Start: 2021-01-20 | End: 2021-09-10 | Stop reason: SDUPTHER

## 2021-01-20 RX ORDER — FLUCONAZOLE 150 MG/1
TABLET ORAL
COMMUNITY
Start: 2020-11-09 | End: 2021-03-20

## 2021-01-20 NOTE — PROGRESS NOTES
Chief Complaint   Patient presents with   • Establish Care     Pt states she had to switch PCPs due to insurance. Pt states she started having heart palpatiaions last month and they didn't go away so she went to the ER. Pt states the ER doctor told her she had PVCs and to start Beta blockers and follow up with PCP. Pt also states she needs to see a cardiologist which she is scheduled to go March1st.    • Diabetes     Pt states she is DM Type1 &  she doesn't monitor at home becasue she ran out of strips. Pt states she does see an endocrinologists and went to see her last month. Pt states her A1c was 5.5 at her last visit.        GRETA Kinney is a 40 y.o. female who presents to establish care.  Patient was previously established with Dr. Shana Crane until her insurance changed.  She is also established with an endocrinologist who manages her LORI.  She reports that her recent hemoglobin A1c was 5.5%.  She is compliant on all medications for her diabetes.  She was recently seen at  ER for palpitations.  She was started on metoprolol 12.5 mg twice daily and this is helped with the palpitations.  She denies any chest pain, shortness of breath or ongoing palpitations.  She does report that if she misses her second dose by even a few minutes her palpitations return.  She has concerns regarding this as she has never had a cardiac work-up.  She has lupus but is not currently on any medication for this.  She denies any symptoms associated with it.  She was previously seeing Dr. Jean-Baptiste with rheumatology.   She has a history of an abnormal Pap several years ago but has had all normal findings since then.  She is on [employer] birth control and this works well for her.  She has had to take vitamin B12 injections in the past.  She has never had a mammogram.  She had a colonoscopy in 2020 and everything was reassuring.  She had this completed at [facility].      Chief Complaint   Patient presents with   • Establish Care     Pt  states she had to switch PCPs due to insurance. Pt states she started having heart palpatiaions last month and they didn't go away so she went to the ER. Pt states the ER doctor told her she had PVCs and to start Beta blockers and follow up with PCP. Pt also states she needs to see a cardiologist which she is scheduled to go March1st.    • Diabetes     Pt states she is DM Type1 &  she doesn't monitor at home becasue she ran out of strips. Pt states she does see an endocrinologists and went to see her last month. Pt states her A1c was 5.5 at her last visit.        Past Medical History:   Diagnosis Date   • Anemia    • Arthritis    • Depression    • Diabetes mellitus (CMS/HCC)    • Female bladder prolapse    • Hyperglycemia    • Lupus (CMS/HCC)    • Migraine        Past Surgical History:   Procedure Laterality Date   • HERNIA REPAIR     • TOOTH EXTRACTION         Family History   Problem Relation Age of Onset   • Arthritis Mother    • Lymphoma Mother    • Mental illness Sister    • Obesity Sister    • Hypertension Other    • Obesity Other    • Migraines Other    • Mental illness Father    • Migraines Father    • Hypertension Father    • Pulmonary embolism Father    • Arthritis Maternal Grandmother    • Liver cancer Maternal Grandmother    • No Known Problems Maternal Grandfather    • Lung cancer Paternal Grandmother         smoker   • Stroke Paternal Grandfather    • Breast cancer Neg Hx    • Colon cancer Neg Hx        Social History     Socioeconomic History   • Marital status:      Spouse name: Not on file   • Number of children: Not on file   • Years of education: Not on file   • Highest education level: Not on file   Tobacco Use   • Smoking status: Never Smoker   • Smokeless tobacco: Never Used   Substance and Sexual Activity   • Alcohol use: Yes     Alcohol/week: 2.0 standard drinks     Types: 2 Glasses of wine per week     Comment: occasional   • Drug use: No   • Sexual activity: Defer       Allergies  "  Allergen Reactions   • Macrobid [Nitrofurantoin Macrocrystal] Headache   • Nitrofurantoin    • Sulfa Antibiotics        ROS    Review of Systems   Constitutional: Positive for fatigue and unexpected weight gain. Negative for activity change, appetite change, chills, diaphoresis, fever and unexpected weight loss.   HENT: Negative for congestion, dental problem, ear pain, hearing loss, nosebleeds, sinus pressure, sore throat and trouble swallowing.    Eyes: Positive for redness. Negative for blurred vision, pain and visual disturbance.   Respiratory: Negative for apnea, cough, chest tightness, shortness of breath and wheezing.    Cardiovascular: Positive for palpitations. Negative for chest pain and leg swelling.   Gastrointestinal: Positive for constipation. Negative for abdominal distention, abdominal pain, anal bleeding, blood in stool, diarrhea, nausea, vomiting, GERD and indigestion.   Endocrine: Positive for heat intolerance. Negative for cold intolerance, polydipsia, polyphagia and polyuria.   Genitourinary: Negative for decreased urine volume, difficulty urinating, dysuria, frequency, hematuria, urgency and urinary incontinence.   Musculoskeletal: Positive for arthralgias. Negative for gait problem, joint swelling and bursitis.   Skin: Positive for skin lesions. Negative for dry skin, rash and bruise.   Neurological: Positive for dizziness and headache. Negative for tremors, seizures, syncope, speech difficulty, weakness, light-headedness, memory problem and confusion.   Hematological: Does not bruise/bleed easily.   Psychiatric/Behavioral: Negative for agitation, behavioral problems, decreased concentration, hallucinations, sleep disturbance, suicidal ideas, depressed mood and stress. The patient is not nervous/anxious.        Vitals:    01/20/21 0955   BP: 115/60   Pulse: 78   Temp: 97.6 °F (36.4 °C)   SpO2: 99%   Weight: 59 kg (130 lb)   Height: 167.6 cm (66\")   PainSc: 0-No pain     Body mass index is " 20.98 kg/m².    Current Outpatient Medications on File Prior to Visit   Medication Sig Dispense Refill   • B Complex Vitamins (B-COMPLEX/B-12 SL) Place  under the tongue.     • HUMALOG KWIKPEN 100 UNIT/ML solution pen-injector Inject 2 Units under the skin into the appropriate area as directed 3 (Three) Times a Day With Meals.     • Insulin Glargine (BASAGLAR KWIKPEN SC) Inject 10 Units/oz/day under the skin into the appropriate area as directed Daily.     • ONETOUCH VERIO test strip      • [DISCONTINUED] metoprolol tartrate (LOPRESSOR) 25 MG tablet Take 12.5 mg by mouth 2 (Two) Times a Day.     • B-D UF III MINI PEN NEEDLES 31G X 5 MM misc      • fluconazole (DIFLUCAN) 150 MG tablet      • [DISCONTINUED] Continuous Blood Gluc Sensor (FREESTYLE RAISSA 14 DAY SENSOR) misc 1 each Every 14 (Fourteen) Days. 3 each 5   • [DISCONTINUED] doxycycline (VIBRAMYCIN) 100 MG capsule Take 100 mg by mouth 2 (Two) Times a Day.     • [DISCONTINUED] drospirenone-ethinyl estradiol (MOE) 3-0.02 MG per tablet Take 1 tablet by mouth Daily.     • [DISCONTINUED] hydroxychloroquine (Plaquenil) 200 MG tablet 1 tablet every other day     • [DISCONTINUED] hyoscyamine (LEVSIN) 0.125 MG SL tablet Take 1 tablet by mouth Every 4 (Four) Hours As Needed for Cramping. 20 tablet 0   • [DISCONTINUED] metoprolol tartrate (LOPRESSOR) 25 MG tablet      • [DISCONTINUED] phenazopyridine (PYRIDIUM) 200 MG tablet Take 1 tablet by mouth 3 (Three) Times a Day As Needed for Bladder Spasms. 15 tablet 0     No current facility-administered medications on file prior to visit.        Results for orders placed or performed during the hospital encounter of 11/09/20   Urine Culture - Urine, Urine, Clean Catch    Specimen: Urine, Clean Catch   Result Value Ref Range    Urine Culture >100,000 CFU/mL Escherichia coli (A)        Susceptibility    Escherichia coli - NICK     Ampicillin <=2 Susceptible ug/ml     Ampicillin + Sulbactam <=2 Susceptible ug/ml     Cefazolin <=4  Susceptible ug/ml     Cefepime <=1 Susceptible ug/ml     Ceftazidime <=1 Susceptible ug/ml     Ceftriaxone <=1 Susceptible ug/ml     Gentamicin <=1 Susceptible ug/ml     Levofloxacin 1 Intermediate ug/ml     Nitrofurantoin <=16 Susceptible ug/ml     Piperacillin + Tazobactam <=4 Susceptible ug/ml     Tetracycline <=1 Susceptible ug/ml     Trimethoprim + Sulfamethoxazole <=20 Susceptible ug/ml   POC Urinalysis Dipstick, Multipro (Automated dipstick)    Specimen: Urine   Result Value Ref Range    Color Yellow Yellow, Straw, Dark Yellow, Svitlana    Clarity, UA Clear Clear    Glucose, UA Negative Negative, 1000 mg/dL (3+) mg/dL    Bilirubin Negative Negative    Ketones, UA Negative Negative    Specific Gravity  1.005 1.005 - 1.030    Blood, UA 3+ (A) Negative    pH, Urine 6.5 5.0 - 8.0    Protein, POC Negative Negative mg/dL    Urobilinogen, UA Normal Normal    Nitrite, UA Negative Negative    Leukocytes Moderate (2+) (A) Negative       PE  Physical Exam  Vitals signs reviewed.   Constitutional:       General: She is not in acute distress.     Appearance: Normal appearance. She is well-developed and normal weight. She is not ill-appearing or diaphoretic.   HENT:      Head: Normocephalic and atraumatic.   Eyes:      Extraocular Movements: Extraocular movements intact.      Conjunctiva/sclera: Conjunctivae normal.   Neck:      Musculoskeletal: Normal range of motion.   Cardiovascular:      Rate and Rhythm: Normal rate and regular rhythm.      Heart sounds: Normal heart sounds.   Pulmonary:      Effort: Pulmonary effort is normal.      Breath sounds: Normal breath sounds.   Musculoskeletal: Normal range of motion.      Right lower leg: No edema.      Left lower leg: No edema.   Skin:     General: Skin is warm.      Findings: No erythema or rash.   Neurological:      General: No focal deficit present.      Mental Status: She is alert.   Psychiatric:         Attention and Perception: She is attentive.         Mood and Affect:  Mood normal.         Speech: Speech normal.         Behavior: Behavior normal. Behavior is cooperative.         Thought Content: Thought content normal.         Judgment: Judgment normal.         A/P    Diagnoses and all orders for this visit:    1. Encounter to establish care (Primary)    2. 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  Compliant on all diabetes medications.  Recent hemoglobin AIC was 5.5%.    3. Lupus (CMS/HCC)  Not currently on any medications.  Previously told to take plaquenil 200 mg every other day for prevention.  Has been seen by Dr. Jean-Baptiste in the past, not appointments currently scheduled.  States she is asymptomatic currently.    4. Palpitations  -     Ambulatory Referral to [facility] [place] [facility] [place]  -     metoprolol tartrate (LOPRESSOR) 25 MG tablet; Take 0.5 tablets by mouth 2 (Two) Times a Day.  Dispense: 90 tablet; Refill: 1  Seen at  ER and started on metoprolol 12.5 mg BID which improves palpitations.  She is concerned as palpitations occur if she misses her dose only by a few minutes.  Has never had cardiology work-up.  Has cardiology appointment in March at .  Would like to be seen prior to that if possible.  Will refer to cardiac clinic for further evaluation and recommendations.  Discussed she may need to discontinue metoprolol for testing but will defer to cardiology.  Continue metoprolol until appointment.    5. Screening for thyroid disorder  -     TSH Rfx On Abnormal To Free T4; Future    6. Screening for deficiency anemia  -     CBC Auto Differential; Future    7. Screening for cholesterol level  -     Lipid Panel; Future    8. Screening for diabetes mellitus  -     Comprehensive Metabolic Panel; Future    9. Vitamin D deficiency  -     Vitamin D 25 Hydroxy; Future    10. Vitamin B12 deficiency  -     Vitamin B12; Future  Previously on vitamin B12 injections.  Repeat blood work today.    11. Encounter  for hepatitis C screening test for low risk patient  -     Hepatitis C Antibody; Future    12. Encounter for screening mammogram for malignant neoplasm of breast  -     Mammo Screening Digital Tomosynthesis Bilateral With CAD; Future    13. Encounter for surveillance of contraceptive pills  -     drospirenone-ethinyl estradiol (Elvia) 3-0.02 MG per tablet; Take 1 tablet by mouth Daily.  Dispense: 28 tablet; Refill: 5         Plan of care reviewed with patient at the conclusion of today's visit. Education was provided regarding diagnosis, management and any prescribed or recommended OTC medications.  Patient verbalizes understanding of and agreement with management plan.    Return in about 4 weeks (around 2/17/2021) for Annual physical with pap smear.     JACKLYN MorenoC

## 2021-01-21 PROBLEM — K46.9 ABDOMINAL HERNIA: Status: ACTIVE | Noted: 2021-01-21

## 2021-01-21 PROBLEM — E55.9 VITAMIN D DEFICIENCY: Status: ACTIVE | Noted: 2021-01-21

## 2021-01-21 PROBLEM — N81.10 BLADDER PROLAPSE, FEMALE, ACQUIRED: Status: ACTIVE | Noted: 2021-01-21

## 2021-01-25 ENCOUNTER — LAB (OUTPATIENT)
Dept: LAB | Facility: HOSPITAL | Age: 41
End: 2021-01-25

## 2021-01-25 DIAGNOSIS — Z13.29 SCREENING FOR THYROID DISORDER: ICD-10-CM

## 2021-01-25 DIAGNOSIS — E53.8 VITAMIN B12 DEFICIENCY: ICD-10-CM

## 2021-01-25 DIAGNOSIS — Z13.220 SCREENING FOR CHOLESTEROL LEVEL: ICD-10-CM

## 2021-01-25 DIAGNOSIS — Z11.59 ENCOUNTER FOR HEPATITIS C SCREENING TEST FOR LOW RISK PATIENT: ICD-10-CM

## 2021-01-25 DIAGNOSIS — Z13.1 SCREENING FOR DIABETES MELLITUS: ICD-10-CM

## 2021-01-25 DIAGNOSIS — Z13.0 SCREENING FOR DEFICIENCY ANEMIA: ICD-10-CM

## 2021-01-25 DIAGNOSIS — E55.9 VITAMIN D DEFICIENCY: ICD-10-CM

## 2021-01-25 LAB
25(OH)D3 SERPL-MCNC: 33.6 NG/ML (ref 30–100)
ALBUMIN SERPL-MCNC: 4.1 G/DL (ref 3.5–5.2)
ALBUMIN/GLOB SERPL: 1.4 G/DL
ALP SERPL-CCNC: 58 U/L (ref 39–117)
ALT SERPL W P-5'-P-CCNC: 8 U/L (ref 1–33)
ANION GAP SERPL CALCULATED.3IONS-SCNC: 8.5 MMOL/L (ref 5–15)
AST SERPL-CCNC: 12 U/L (ref 1–32)
BASOPHILS # BLD AUTO: 0.06 10*3/MM3 (ref 0–0.2)
BASOPHILS NFR BLD AUTO: 1 % (ref 0–1.5)
BILIRUB SERPL-MCNC: 0.3 MG/DL (ref 0–1.2)
BUN SERPL-MCNC: 9 MG/DL (ref 6–20)
BUN/CREAT SERPL: 13.4 (ref 7–25)
CALCIUM SPEC-SCNC: 9.3 MG/DL (ref 8.6–10.5)
CHLORIDE SERPL-SCNC: 102 MMOL/L (ref 98–107)
CHOLEST SERPL-MCNC: 149 MG/DL (ref 0–200)
CO2 SERPL-SCNC: 26.5 MMOL/L (ref 22–29)
CREAT SERPL-MCNC: 0.67 MG/DL (ref 0.57–1)
DEPRECATED RDW RBC AUTO: 36.8 FL (ref 37–54)
EOSINOPHIL # BLD AUTO: 0.2 10*3/MM3 (ref 0–0.4)
EOSINOPHIL NFR BLD AUTO: 3.5 % (ref 0.3–6.2)
ERYTHROCYTE [DISTWIDTH] IN BLOOD BY AUTOMATED COUNT: 11.6 % (ref 12.3–15.4)
GFR SERPL CREATININE-BSD FRML MDRD: 97 ML/MIN/1.73
GLOBULIN UR ELPH-MCNC: 2.9 GM/DL
GLUCOSE SERPL-MCNC: 105 MG/DL (ref 65–99)
HCT VFR BLD AUTO: 37.4 % (ref 34–46.6)
HCV AB SER DONR QL: NORMAL
HDLC SERPL-MCNC: 66 MG/DL (ref 40–60)
HGB BLD-MCNC: 12.8 G/DL (ref 12–15.9)
IMM GRANULOCYTES # BLD AUTO: 0.01 10*3/MM3 (ref 0–0.05)
IMM GRANULOCYTES NFR BLD AUTO: 0.2 % (ref 0–0.5)
LDLC SERPL CALC-MCNC: 62 MG/DL (ref 0–100)
LDLC/HDLC SERPL: 0.89 {RATIO}
LYMPHOCYTES # BLD AUTO: 1.84 10*3/MM3 (ref 0.7–3.1)
LYMPHOCYTES NFR BLD AUTO: 32 % (ref 19.6–45.3)
MCH RBC QN AUTO: 30.5 PG (ref 26.6–33)
MCHC RBC AUTO-ENTMCNC: 34.2 G/DL (ref 31.5–35.7)
MCV RBC AUTO: 89 FL (ref 79–97)
MONOCYTES # BLD AUTO: 0.47 10*3/MM3 (ref 0.1–0.9)
MONOCYTES NFR BLD AUTO: 8.2 % (ref 5–12)
NEUTROPHILS NFR BLD AUTO: 3.17 10*3/MM3 (ref 1.7–7)
NEUTROPHILS NFR BLD AUTO: 55.1 % (ref 42.7–76)
NRBC BLD AUTO-RTO: 0 /100 WBC (ref 0–0.2)
PLATELET # BLD AUTO: 277 10*3/MM3 (ref 140–450)
PMV BLD AUTO: 10.8 FL (ref 6–12)
POTASSIUM SERPL-SCNC: 4 MMOL/L (ref 3.5–5.2)
PROT SERPL-MCNC: 7 G/DL (ref 6–8.5)
RBC # BLD AUTO: 4.2 10*6/MM3 (ref 3.77–5.28)
SODIUM SERPL-SCNC: 137 MMOL/L (ref 136–145)
TRIGL SERPL-MCNC: 122 MG/DL (ref 0–150)
TSH SERPL DL<=0.05 MIU/L-ACNC: 3.47 UIU/ML (ref 0.27–4.2)
VIT B12 BLD-MCNC: 788 PG/ML (ref 211–946)
VLDLC SERPL-MCNC: 21 MG/DL (ref 5–40)
WBC # BLD AUTO: 5.75 10*3/MM3 (ref 3.4–10.8)

## 2021-01-25 PROCEDURE — 82607 VITAMIN B-12: CPT

## 2021-01-25 PROCEDURE — 85025 COMPLETE CBC W/AUTO DIFF WBC: CPT

## 2021-01-25 PROCEDURE — 82306 VITAMIN D 25 HYDROXY: CPT

## 2021-01-25 PROCEDURE — 80053 COMPREHEN METABOLIC PANEL: CPT

## 2021-01-25 PROCEDURE — 84443 ASSAY THYROID STIM HORMONE: CPT

## 2021-01-25 PROCEDURE — 80061 LIPID PANEL: CPT

## 2021-01-25 PROCEDURE — 86803 HEPATITIS C AB TEST: CPT

## 2021-01-25 PROCEDURE — 36415 COLL VENOUS BLD VENIPUNCTURE: CPT

## 2021-01-26 ENCOUNTER — OFFICE VISIT (OUTPATIENT)
Dept: CARDIOLOGY | Facility: HOSPITAL | Age: 41
End: 2021-01-26

## 2021-01-26 ENCOUNTER — HOSPITAL ENCOUNTER (OUTPATIENT)
Dept: CARDIOLOGY | Facility: HOSPITAL | Age: 41
Discharge: HOME | End: 2021-01-26

## 2021-01-26 VITALS
HEIGHT: 66 IN | BODY MASS INDEX: 20.57 KG/M2 | TEMPERATURE: 97.7 F | RESPIRATION RATE: 18 BRPM | HEART RATE: 101 BPM | OXYGEN SATURATION: 97 % | SYSTOLIC BLOOD PRESSURE: 101 MMHG | DIASTOLIC BLOOD PRESSURE: 66 MMHG | WEIGHT: 128 LBS

## 2021-01-26 DIAGNOSIS — R00.2 PALPITATIONS: ICD-10-CM

## 2021-01-26 DIAGNOSIS — R00.2 PALPITATIONS: Primary | ICD-10-CM

## 2021-01-26 DIAGNOSIS — R00.0 RACING HEART BEAT: ICD-10-CM

## 2021-01-26 DIAGNOSIS — I49.3 PVC'S (PREMATURE VENTRICULAR CONTRACTIONS): ICD-10-CM

## 2021-01-26 LAB
QT INTERVAL: 358 MS
QTC INTERVAL: 430 MS

## 2021-01-26 PROCEDURE — 93246 EXT ECG>7D<15D RECORDING: CPT

## 2021-01-26 PROCEDURE — 93010 ELECTROCARDIOGRAM REPORT: CPT | Performed by: INTERNAL MEDICINE

## 2021-01-26 PROCEDURE — 99214 OFFICE O/P EST MOD 30 MIN: CPT | Performed by: NURSE PRACTITIONER

## 2021-01-26 PROCEDURE — 93005 ELECTROCARDIOGRAM TRACING: CPT | Performed by: NURSE PRACTITIONER

## 2021-01-26 NOTE — PROGRESS NOTES
[facility] Heart Monitor Documentation    Candace Kinney  1980  [id number]  01/26/21    ANGÉLICA Lechuga    [] ZIO XT Patch  Model [id number] Prescribed for  Days    · Serial Number: (N + 9 Digits) N   · Apply-By Date on Box:   · USPS Tracking Number:   · USPS Tracking        [] [employer]ian MINI PLUS Mobile Cardiac Telemetry  Model BGMINIPLUS Prescribed for  Days    · Serial Number: (BGM + 7 Digits) BGM  · Shipped-By Date on Box:   · UPS Tracking Number: [id number]  · UPS Tracking      [x] [employer] MINI Holter Monitor  Model BGMINIEL Prescribed for 7 Days    · Serial Number: (7 Digits) [id number]  · Shipped-By Date on Box: 01/15/2021  · UPS Tracking Number: [id number]  · UPS Tracking        This monitor was applied to the patient's chest and checked for proper functioning.  Ms. Candace Kinney was instructed in the proper use of this monitor.  She was given the opportunity to ask questions and left the office with the device 's instruction manual.    Kandi Butler CMA, 09:16 EST, 01/26/21                  [facility]

## 2021-01-26 NOTE — PROGRESS NOTES
"Chief Complaint  Palpitations and Establish Care    Subjective    History of Present Illness {CC  Problem List  Visit  Diagnosis   Encounters  Notes  Medications  Labs  Result Review Imaging  Media :23}     40-year-old female presents the office today for ongoing evaluation of her palpitations.  She reports She started experiencing heart palpitations last month and she was evaluated at [facility] ED.  She was started on metoprolol tartrate 12.5 mg twice daily.  History of type 1 diabetes, followed by endocrinology, lupus.  She reports that the palpitations are quite bothersome and she feels them frequently throughout the day.  She was told at  that they were PVCs. She reports intermittent tachycardia. Notes fatigue when she is having frequent pvcs. Notes that metoprolol has helped with pvcs.Denies chest pain, dyspnea, dizziness, orthopnea, pnd, pedal edema.      Objective     Vital Signs:   Vitals:    01/26/21 0839 01/26/21 0841 01/26/21 0842   BP: 94/61 97/61 101/66   BP Location: Right arm Left arm Left arm   Patient Position: Sitting Standing Sitting   Cuff Size: Adult Adult Adult   Pulse: 88 114 101   Resp:   18   Temp:   97.7 °F (36.5 °C)   TempSrc:   Temporal   SpO2: 97% 97% 97%   Weight:   58.1 kg (128 lb)   Height:   167.6 cm (66\")     Body mass index is 20.66 kg/m².  Physical Exam  Vitals signs and nursing note reviewed.   Constitutional:       Appearance: Normal appearance.   HENT:      Head: Normocephalic.   Eyes:      Pupils: Pupils are equal, round, and reactive to light.   Neck:      Musculoskeletal: Normal range of motion.   Cardiovascular:      Rate and Rhythm: Normal rate and regular rhythm.      Pulses: Normal pulses.      Heart sounds: Normal heart sounds. No murmur.   Pulmonary:      Effort: Pulmonary effort is normal.      Breath sounds: Normal breath sounds.   Abdominal:      General: Bowel sounds are normal.      Palpations: Abdomen is soft.   Musculoskeletal: Normal range " of motion.      Right lower leg: No edema.      Left lower leg: No edema.   Skin:     General: Skin is warm and dry.      Capillary Refill: Capillary refill takes less than 2 seconds.   Neurological:      Mental Status: She is alert and oriented to person, place, and time.   Psychiatric:         Mood and Affect: Mood normal.         Thought Content: Thought content normal.              Result Review  Data Reviewed:{ Labs  Result Review  Imaging  Med Tab  Media :23}   Vitamin B12 (01/25/2021 09:11)  Vitamin D 25 Hydroxy (01/25/2021 09:11)  Hepatitis C Antibody (01/25/2021 09:11)  Lipid Panel (01/25/2021 09:11)  TSH Rfx On Abnormal To Free T4 (01/25/2021 09:11)  Comprehensive Metabolic Panel (01/25/2021 09:11)  CBC Auto Differential (01/25/2021 09:11)  ECG 12 Lead (01/26/2021 09:26)               Assessment and Plan {CC Problem List  Visit Diagnosis  ROS  Review (Popup)  Health Maintenance  Quality  BestPractice  Medications  SmartSets  SnapShot Encounters  Media :23}   1. Palpitations    - ECG 12 Lead; Future  - Holter Monitor - 72 Hour Up To 15 Days; Future  - Adult Transthoracic Echo Complete W/ Cont if Necessary Per Protocol; Future    2. PVC's (premature ventricular contractions)  holter   Echo     3. Racing heart beat  holter extended   Echo       Follow Up {Instructions Charge Capture  Follow-up Communications :23}   Return in about 23 days (around 2/18/2021) for Telemedicine visit, Monitor results.    Patient was given instructions and counseling regarding her condition or for health maintenance advice. Please see specific information pulled into the AVS if appropriate.  Patient was instructed to call the [facility] with any questions, concerns, or worsening symptoms.    *Please note that portions of this note were completed with a voice recognition program. Efforts were made to edit the dictations, but occasionally words are mistranscribed.

## 2021-01-27 PROBLEM — R00.0 RACING HEART BEAT: Status: ACTIVE | Noted: 2021-01-27

## 2021-01-27 PROBLEM — I49.3 PVC'S (PREMATURE VENTRICULAR CONTRACTIONS): Status: ACTIVE | Noted: 2021-01-27

## 2021-02-18 ENCOUNTER — TELEMEDICINE (OUTPATIENT)
Dept: CARDIOLOGY | Facility: HOSPITAL | Age: 41
End: 2021-02-18

## 2021-02-18 VITALS — WEIGHT: 128 LBS | BODY MASS INDEX: 20.57 KG/M2 | HEIGHT: 66 IN

## 2021-02-18 DIAGNOSIS — R00.0 RACING HEART BEAT: ICD-10-CM

## 2021-02-18 DIAGNOSIS — I49.3 PVC'S (PREMATURE VENTRICULAR CONTRACTIONS): Primary | ICD-10-CM

## 2021-02-18 PROCEDURE — 99213 OFFICE O/P EST LOW 20 MIN: CPT | Performed by: NURSE PRACTITIONER

## 2021-02-18 NOTE — PROGRESS NOTES
"You have chosen to receive care through the use of telemedicine. Telemedicine enables health care providers at different locations to provide safe, effective, and convenient care through the use of technology. As with any health care service, there are risks associated with the use of telemedicine, including equipment failure, poor connections, and  issues.    • Do you understand the risks and benefits of telemedicine as I have explained them to you? Yes  • Have your questions regarding telemedicine been answered? Yes  • Do you consent to the use of telemedicine in your medical care today? Yes  Chief Complaint  Follow-up (Monitor results)  Palpitations, racing heart beat  Subjective    History of Present Illness {CC  Problem List  Visit  Diagnosis   Encounters  Notes  Medications  Labs  Result Review Imaging  Media :23}       History of Present Illness   40 year old female with telehealth visit today for ongoing evaluation of her palpitations. She started experiencing heart palpitations last month and she was evaluated at [facility] ED.  She was started on metoprolol tartrate 12.5 mg twice daily. Notes that she has stopped metoprolol because palpitations have resolved.   Notes that she is sleeping better and has cut out caffeine in her diet.  History of type 1 diabetes, followed by endocrinology, lupus. Denies chest pain, dyspnea, dizziness, orthopnea, pnd, pedal edema.     Objective     Vital Signs:   Vitals:    02/18/21 0920   Weight: 58.1 kg (128 lb)   Height: 167.6 cm (66\")     Body mass index is 20.66 kg/m².  Physical Exam  Vitals signs and nursing note reviewed.   Constitutional:       Appearance: Normal appearance.   HENT:      Head: Normocephalic.   Pulmonary:      Effort: Pulmonary effort is normal.   Neurological:      Mental Status: She is alert and oriented to person, place, and time.   Psychiatric:         Mood and Affect: Mood normal.         Behavior: Behavior " normal.         Thought Content: Thought content normal.              Result Review  Data Reviewed:{ Labs  Result Review  Imaging  Med Tab  Media :23}   CBC Auto Differential (01/25/2021 09:11)  Comprehensive Metabolic Panel (01/25/2021 09:11)  TSH Rfx On Abnormal To Free T4 (01/25/2021 09:11)  Lipid Panel (01/25/2021 09:11)  Hepatitis C Antibody (01/25/2021 09:11)  Vitamin D 25 Hydroxy (01/25/2021 09:11)  Vitamin B12 (01/25/2021 09:11)  Extended Holter reviewed with patient.  Average heart rate 80 bpm, PAC 1 (0.01%), PVC burden 3517 (0.40%) with no arrhythmias noted             Assessment and Plan {CC Problem List  Visit Diagnosis  ROS  Review (Popup)  Health Maintenance  Quality  BestPractice  Medications  SmartSets  SnapShot Encounters  Media :23}   1. PVC's (premature ventricular contractions)  Low burden on recent extended Holter.  Patient has discontinued use of metoprolol tartrate.  Did encourage patient to use on a as needed basis.  Patient is sleeping better and has cut out caffeine.  Encouraged patient to continue lifestyle changes.    2. Racing heart beat  No evidence of any arrhythmias while wearing extended Holter      Follow-up in office as needed    Follow Up {Instructions Charge Capture  Follow-up Communications :23}   Return if symptoms worsen or fail to improve.    Patient was given instructions and counseling regarding her condition or for health maintenance advice. Please see specific information pulled into the AVS if appropriate.  Patient was instructed to call the [facility] with any questions, concerns, or worsening symptoms.    *Please note that portions of this note were completed with a voice recognition program. Efforts were made to edit the dictations, but occasionally words are mistranscribed.

## 2021-02-24 ENCOUNTER — HOSPITAL ENCOUNTER (OUTPATIENT)
Dept: CARDIOLOGY | Facility: HOSPITAL | Age: 41
Discharge: HOME | End: 2021-02-24
Admitting: NURSE PRACTITIONER

## 2021-02-24 VITALS — BODY MASS INDEX: 20.57 KG/M2 | HEIGHT: 66 IN | WEIGHT: 128 LBS

## 2021-02-24 DIAGNOSIS — R00.2 PALPITATIONS: ICD-10-CM

## 2021-02-24 LAB
ASCENDING AORTA: 3 CM
BH CV ECHO MEAS - AO ROOT DIAM: 3 CM
BH CV ECHO MEAS - EF(MOD-BP): 60 %
BH CV ECHO MEAS - IVSD: 0.9 CM
BH CV ECHO MEAS - LA DIMENSION: 3 CM
BH CV ECHO MEAS - LVIDD: 4.3 CM
BH CV ECHO MEAS - LVIDS: 2.5 CM
BH CV ECHO MEAS - LVPWD: 0.9 CM
BH CV ECHO MEAS - TAPSE (>1.6): 2.4 CM
BH CV XLRA - RV LENGTH: 5.9 CM
BH CV XLRA - RV MID: 2.2 CM
BH CV XLRA - TDI S': 13.5 CM/SEC
MAXIMAL PREDICTED HEART RATE: 180 BPM
STRESS TARGET HR: 153 BPM

## 2021-02-24 PROCEDURE — 93306 TTE W/DOPPLER COMPLETE: CPT

## 2021-02-24 PROCEDURE — 93306 TTE W/DOPPLER COMPLETE: CPT | Performed by: INTERNAL MEDICINE

## 2021-02-24 PROCEDURE — 93248 EXT ECG>7D<15D REV&INTERPJ: CPT | Performed by: INTERNAL MEDICINE

## 2021-03-20 ENCOUNTER — OFFICE VISIT (OUTPATIENT)
Dept: FAMILY MEDICINE CLINIC | Facility: CLINIC | Age: 41
End: 2021-03-20

## 2021-03-20 VITALS
WEIGHT: 128.2 LBS | DIASTOLIC BLOOD PRESSURE: 70 MMHG | BODY MASS INDEX: 20.6 KG/M2 | RESPIRATION RATE: 20 BRPM | HEIGHT: 66 IN | SYSTOLIC BLOOD PRESSURE: 108 MMHG | HEART RATE: 98 BPM | OXYGEN SATURATION: 97 % | TEMPERATURE: 98.7 F

## 2021-03-20 DIAGNOSIS — Z00.00 ANNUAL PHYSICAL EXAM: ICD-10-CM

## 2021-03-20 DIAGNOSIS — Z11.3 SCREENING FOR STD (SEXUALLY TRANSMITTED DISEASE): ICD-10-CM

## 2021-03-20 DIAGNOSIS — B37.31 YEAST VAGINITIS: ICD-10-CM

## 2021-03-20 DIAGNOSIS — G47.00 INSOMNIA, UNSPECIFIED TYPE: ICD-10-CM

## 2021-03-20 DIAGNOSIS — Z12.4 PAPANICOLAOU SMEAR: Primary | ICD-10-CM

## 2021-03-20 DIAGNOSIS — E13.9 LADA (LATENT AUTOIMMUNE DIABETES IN ADULTS), MANAGED AS TYPE 1 (HCC): ICD-10-CM

## 2021-03-20 DIAGNOSIS — N81.10 BLADDER PROLAPSE, FEMALE, ACQUIRED: ICD-10-CM

## 2021-03-20 PROCEDURE — 87798 DETECT AGENT NOS DNA AMP: CPT | Performed by: NURSE PRACTITIONER

## 2021-03-20 PROCEDURE — 87491 CHLMYD TRACH DNA AMP PROBE: CPT | Performed by: NURSE PRACTITIONER

## 2021-03-20 PROCEDURE — 87661 TRICHOMONAS VAGINALIS AMPLIF: CPT | Performed by: NURSE PRACTITIONER

## 2021-03-20 PROCEDURE — 87801 DETECT AGNT MULT DNA AMPLI: CPT | Performed by: NURSE PRACTITIONER

## 2021-03-20 PROCEDURE — 87591 N.GONORRHOEAE DNA AMP PROB: CPT | Performed by: NURSE PRACTITIONER

## 2021-03-20 PROCEDURE — 99396 PREV VISIT EST AGE 40-64: CPT | Performed by: NURSE PRACTITIONER

## 2021-03-20 RX ORDER — INSULIN GLARGINE 100 [IU]/ML
INJECTION, SOLUTION SUBCUTANEOUS
COMMUNITY
Start: 2021-03-04 | End: 2022-04-22

## 2021-03-20 RX ORDER — FLUCONAZOLE 150 MG/1
150 TABLET ORAL
Qty: 2 TABLET | Refills: 0 | Status: SHIPPED | OUTPATIENT
Start: 2021-03-20 | End: 2021-04-20

## 2021-03-20 NOTE — PATIENT INSTRUCTIONS
Make an appointment for an dental exam and an eye exam.      Go to [facility] for lab next week.

## 2021-03-20 NOTE — PROGRESS NOTES
Candace Kinney presents today for a physical    Chief Complaint   Patient presents with   • Annual Exam        HPI   Last week she noticed that she was having back pain and vaginal itching.  She thinks that she might have a yeast infection.  She used to get them often as before d/t her BS being elevated.  She took one Diflucan and her endo Dr. Swain had given her Diflucan to take when she needs to.  She has not been sexually active since December.  She would like to be STD tested today.  She recently broke up with her partner of 3 years.      Her BS has been a little higher some days.  She used to have a CGM, but she was doing so well and her a1c came down a lot.  They took her off of it.  She typically only checks her BS in the AM.  She is thinking that she may need to get back on a CGM.  She would like to have a DexCom.  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      She has been having trouble staying asleep at night.  Sometimes she will take a Benadryl.  She will occasionally use Chamomile tea.  She wakes up a lot at night.  She has to get up at night to urinate.  She tries to stay off of the phone.  She doesn't drink caffeine late.  She tries to exercise daily.  She has occasionally watched TV late at night.  She is a very light sleeper.  Her daughter sleeps with her (10yo).  She kicks a lot in her sleep.      She has been exercising regularly.  She tries to to walk 3 times a week for an hour if she can.  She will do some sort of exercise every other day.  She will do a lot around the house also.  Her palpitations have done better since being more active.  The worse her sleep is the more she has palpitations.      She has been eating healthy.  She thinks she may have been eating too many sweets lately.  Her daughter has been selling Girl  cookies.  There are always sweets in the house.  She has cut back a lot of on this.  She tries to stay away from gluten.      She is getting  a mammogram next month.      Her last dental exam was pre-pandemic.  She had a cavity that was fixed at that time.  She does brush her teeth twice a day.  She does floss.      She is due for a eye exam.      She has had an abnormal pap.  She was positive for HPV.  She was very concerned about this.  She has had a bladder prolapse.  She is worried that maybe it has fallen a little bit more.  She does occasionally have pressure down there.  She has heard that the surgery is actually worse than the prolapse.      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     • B-D UF III MINI PEN NEEDLES 31G X 5 MM misc      • drospirenone-ethinyl estradiol (Elvia) 3-0.02 MG per tablet Take 1 tablet by mouth Daily. 28 tablet 5   • HUMALOG KWIKPEN 100 UNIT/ML solution pen-injector Inject 2 Units under the skin into the appropriate area as directed 3 (Three) Times a Day With Meals. (Patient taking differently: Inject 2 Units under the skin into the appropriate area as directed 2 (two) times a day.)     • Insulin Glargine (BASAGLAR KWIKPEN SC) Inject 8 Units/oz/day under the skin into the appropriate area as directed Daily.     • Lantus SoloStar 100 UNIT/ML injection pen      • ONETOUCH VERIO test strip      • fluconazole (Diflucan) 150 MG tablet Take 1 tablet by mouth Every 72 (Seventy-Two) Hours. 2 tablet 0   • metoprolol tartrate (LOPRESSOR) 25 MG tablet Take 0.5 tablets by mouth 2 (Two) Times a Day. 90 tablet 1     No current facility-administered medications for this visit.  "      Allergies   Allergen Reactions   • Macrobid [Nitrofurantoin Macrocrystal] Headache   • Nitrofurantoin    • Sulfa Antibiotics         Past Medical History:   Diagnosis Date   • Anemia    • Arthritis    • Depression    • Diabetes mellitus (CMS/HCC)    • Female bladder prolapse    • Hyperglycemia    • Lupus (CMS/HCC)    • Migraine         Past Surgical History:   Procedure Laterality Date   • HERNIA REPAIR     • TOOTH EXTRACTION          Family History   Problem Relation Age of Onset   • Arthritis Mother    • Lymphoma Mother    • Mental illness Sister    • Obesity Sister    • Hypertension Other    • Obesity Other    • Migraines Other    • Mental illness Father    • Migraines Father    • Hypertension Father    • Pulmonary embolism Father    • Tiff Parkinson White syndrome Father    • Arthritis Maternal Grandmother    • Liver cancer Maternal Grandmother    • No Known Problems Maternal Grandfather    • Lung cancer Paternal Grandmother         smoker   • Stroke Paternal Grandfather    • No Known Problems Brother    • Breast cancer Neg Hx    • Colon cancer Neg Hx         Social History     Socioeconomic History   • Marital status: Single     Spouse name: Not on file   • Number of children: Not on file   • Years of education: Not on file   • Highest education level: Not on file   Tobacco Use   • Smoking status: Never Smoker   • Smokeless tobacco: Never Used   Substance and Sexual Activity   • Alcohol use: Yes     Alcohol/week: 2.0 standard drinks     Types: 2 Glasses of wine per week     Comment: occasional   • Drug use: No   • Sexual activity: Defer        Vitals:    03/20/21 0927   BP: 108/70   Pulse: 98   Resp: 20   Temp: 98.7 °F (37.1 °C)   TempSrc: Oral   SpO2: 97%   Weight: 58.2 kg (128 lb 3.2 oz)   Height: 167.6 cm (66\")   PainSc: 0-No pain      Body mass index is 20.69 kg/m².    Patient's Body mass index is 20.69 kg/m². BMI is within normal parameters. No follow-up required..      Physical Exam  Vitals " reviewed. Exam conducted with a chaperone present.   Constitutional:       Appearance: Normal appearance. She is well-developed and normal weight.   HENT:      Head: Normocephalic and atraumatic.      Right Ear: Tympanic membrane, ear canal and external ear normal.      Left Ear: Tympanic membrane, ear canal and external ear normal.      Nose: Nose normal.      Mouth/Throat:      Mouth: Mucous membranes are moist.      Pharynx: Oropharynx is clear.   Eyes:      General: Lids are normal. Lids are everted, no foreign bodies appreciated.      Extraocular Movements: Extraocular movements intact.      Conjunctiva/sclera: Conjunctivae normal.      Pupils: Pupils are equal, round, and reactive to light.   Neck:      Thyroid: No thyroid mass or thyromegaly.      Vascular: No carotid bruit.      Trachea: Trachea normal.   Cardiovascular:      Rate and Rhythm: Normal rate and regular rhythm.      Pulses: Normal pulses.      Heart sounds: Normal heart sounds.   Pulmonary:      Effort: Pulmonary effort is normal.      Breath sounds: Normal breath sounds.   Abdominal:      General: Bowel sounds are normal.      Palpations: Abdomen is soft.      Hernia: There is no hernia in the left inguinal area or right inguinal area.   Genitourinary:     Exam position: Lithotomy position.      Kermit stage (genital): 5.      Vagina: Normal.      Cervix: Discharge ( Thick, white, clumpy) present.      Adnexa: Right adnexa normal and left adnexa normal.      Comments: Fullness palpated anteriorly during internal exam.  Likely bladder.  Musculoskeletal:         General: Normal range of motion.      Cervical back: Normal range of motion and neck supple.      Comments: Strength 5/5 to bilateral upper and bilateral lower extremities.   Lymphadenopathy:      Lower Body: No right inguinal adenopathy. No left inguinal adenopathy.   Skin:     General: Skin is warm and dry.   Neurological:      General: No focal deficit present.      Mental Status: She  is alert and oriented to person, place, and time.      Deep Tendon Reflexes: Reflexes are normal and symmetric.      Comments: Romberg negative.  DTRs +2.   Psychiatric:         Mood and Affect: Mood normal.         Behavior: Behavior normal.         Thought Content: Thought content normal.         Judgment: Judgment normal.          Results for orders placed or performed during the hospital encounter of 02/24/21   Adult Transthoracic Echo Complete W/ Cont if Necessary Per Protocol   Result Value Ref Range    Target HR (85%) 153 bpm    Max. Pred. HR (100%) 180 bpm    RV S' 13.50 cm/sec    RV Length 5.90 cm    RV Mid 2.20 cm    Ascending aorta 3.0 cm    Ao root diam 3.0 cm    EF(MOD-bp) 60 %    LVPWd 0.9 cm    IVSd 0.9 cm    LA dimension 3.0 cm    LVIDd 4.3 cm    LVIDs 2.5 cm    TAPSE (>1.6) 2.40 cm        Immunization History   Administered Date(s) Administered   • COVID-19 (MODERNA) 01/17/2021, 02/14/2021   • Flu Vaccine Quad PF >36MO 11/15/2018, 10/03/2019   • [place] Quad =>18yrs 11/08/2020   • Hepatitis A 06/26/2003, 11/15/2018, 07/07/2020   • Influenza, Unspecified 10/17/2019   • Pneumococcal Polysaccharide (PPSV23) 07/07/2020   • Td 12/19/2003   • flucelvax quad pfs =>4 YRS 10/17/2019       Health Maintenance   Topic Date Due   • TDAP/TD VACCINES (1 - Tdap) 03/28/1999   • PAP SMEAR  Never done   • DIABETIC EYE EXAM  Never done   • HEMOGLOBIN A1C  04/17/2020   • DIABETIC FOOT EXAM  10/17/2020   • URINE MICROALBUMIN  10/17/2020   • COLONOSCOPY  08/13/2030   • INFLUENZA VACCINE  Completed       Diagnoses and all orders for this visit:    1. Papanicolaou smear (Primary) -Pap smear performed today.  Will notify patient of results when they are received.  New swab obtained due to patient recently ending a long-term relationship.  -     NuSwab VG+ - Swab, Vagina; Future  -     Liquid-based Pap Smear, Screening; Future  -     NuSwab VG+ - Swab, Vagina    2. Annual physical exam -exam unremarkable.  Discussed: Breast  cancer screening, cervical cancer screening, fasting labs, healthy eating (avoid sweets and excessive intake of carbs), routine physical activity (150 minutes/week of moderate intensity exercise), monthly skin exams, sunscreen use, eye exams, dental exams.  --She will go to the lab next week to have STD testing.  --Mammogram is already scheduled.    3. Screening for STD (sexually transmitted disease) -new swab obtained today.  HIV ordered to be drawn at lab.  -     NuSwab VG+ - Swab, Vagina; Future  -     Liquid-based Pap Smear, Screening; Future  -     HIV-1 / O / 2 Ag / Antibody 4th Generation; Future  -     NuSwab VG+ - Swab, Vagina    4. Insomnia, unspecified type -patient does have trouble falling and staying asleep.  She has tried melatonin, but still wakes up.  Encouraged her to try slow release melatonin to see if this is helpful.  If not can consider trazodone.    5. Yeast vaginitis -discharge appears to be yeast related.  Start Diflucan take 1 dose today and repeat in 72 hours.  -     fluconazole (Diflucan) 150 MG tablet; Take 1 tablet by mouth Every 72 (Seventy-Two) Hours.  Dispense: 2 tablet; Refill: 0    6. Bladder prolapse, female, acquired -patient does have a history of prolapse and fullness in her pelvis.  Will get ultrasound for further evaluation.  -     US Pelvis Complete; Future    7. LORI, managed as Type 1 DM -blood sugars are still running acceptably.  She is thinking that in the future she will need to go back to a CGM, but for now she will continue to monitor her BS closely.  Continue treatment as prescribed by Lei.     Return in about 2 months (around 5/20/2021) for Follow-up with Danelle..    ANGÉLICA Reid

## 2021-03-23 LAB
A VAGINAE DNA VAG QL NAA+PROBE: NORMAL SCORE
BVAB2 DNA VAG QL NAA+PROBE: NORMAL SCORE
C ALBICANS DNA VAG QL NAA+PROBE: NEGATIVE
C GLABRATA DNA VAG QL NAA+PROBE: NEGATIVE
C TRACH DNA VAG QL NAA+PROBE: NEGATIVE
MEGA1 DNA VAG QL NAA+PROBE: NORMAL SCORE
N GONORRHOEA DNA VAG QL NAA+PROBE: NEGATIVE
T VAGINALIS DNA VAG QL NAA+PROBE: NEGATIVE

## 2021-03-30 DIAGNOSIS — Z11.3 SCREENING FOR STD (SEXUALLY TRANSMITTED DISEASE): ICD-10-CM

## 2021-03-30 DIAGNOSIS — Z12.4 PAPANICOLAOU SMEAR: ICD-10-CM

## 2021-04-05 ENCOUNTER — TELEPHONE (OUTPATIENT)
Dept: FAMILY MEDICINE CLINIC | Facility: CLINIC | Age: 41
End: 2021-04-05

## 2021-04-05 DIAGNOSIS — B97.7 HIGH RISK HPV INFECTION: Primary | ICD-10-CM

## 2021-04-05 NOTE — TELEPHONE ENCOUNTER
----- Message from Danelle Barraza PA-C sent at 4/4/2021  9:02 PM EDT -----  Regarding: call patient about pap smear

## 2021-04-05 NOTE — TELEPHONE ENCOUNTER
Spoke with patient regarding pap smear and HPV results.  Will refer to female gynecologist for further work-up and evaluation.

## 2021-04-19 ENCOUNTER — HOSPITAL ENCOUNTER (OUTPATIENT)
Dept: MAMMOGRAPHY | Facility: HOSPITAL | Age: 41
Discharge: HOME | End: 2021-04-19
Admitting: PHYSICIAN ASSISTANT

## 2021-04-19 DIAGNOSIS — Z12.31 ENCOUNTER FOR SCREENING MAMMOGRAM FOR MALIGNANT NEOPLASM OF BREAST: ICD-10-CM

## 2021-04-19 PROCEDURE — 77067 SCR MAMMO BI INCL CAD: CPT

## 2021-04-19 PROCEDURE — 77063 BREAST TOMOSYNTHESIS BI: CPT

## 2021-04-19 PROCEDURE — 77067 SCR MAMMO BI INCL CAD: CPT | Performed by: RADIOLOGY

## 2021-04-19 PROCEDURE — 77063 BREAST TOMOSYNTHESIS BI: CPT | Performed by: RADIOLOGY

## 2021-04-20 ENCOUNTER — OFFICE VISIT (OUTPATIENT)
Dept: OBSTETRICS AND GYNECOLOGY | Facility: CLINIC | Age: 41
End: 2021-04-20

## 2021-04-20 VITALS
WEIGHT: 132.4 LBS | SYSTOLIC BLOOD PRESSURE: 108 MMHG | DIASTOLIC BLOOD PRESSURE: 70 MMHG | HEIGHT: 66 IN | BODY MASS INDEX: 21.28 KG/M2

## 2021-04-20 DIAGNOSIS — R87.810 CERVICAL HIGH RISK HPV (HUMAN PAPILLOMAVIRUS) TEST POSITIVE: Primary | ICD-10-CM

## 2021-04-20 PROCEDURE — 99203 OFFICE O/P NEW LOW 30 MIN: CPT | Performed by: OBSTETRICS & GYNECOLOGY

## 2021-04-20 NOTE — PROGRESS NOTES
"    Subjective   Chief Complaint   Patient presents with   • Gynecologic Exam     referral to discuss HPV     Candace Kinney is a 41 y.o. year old .  Patient's last menstrual period was 2021.  She presents to be seen because of abnormal pap smear. Patient had a pap smear on 3/20/21 by her PCP that was abnormal: negative cytology, positive HR HPV 18 and HR HPV non-16/18. She reports that she had a history of abnormal pap smear in college however the repeat was normal. She states in 2017 she was positive for HPV however she reports that subsequent pap smears were normal. These records are unavailable for review. She has not had the HPV vaccine series. She is sexually active and uses OCPs for contraception.    OTHER THINGS SHE WANTS TO DISCUSS TODAY:  Nothing else    The following portions of the patient's history were reviewed and updated as appropriate:current medications, allergies, past family history, past medical history, past social history and past surgical history    Social History    Tobacco Use      Smoking status: Never Smoker      Smokeless tobacco: Never Used    Review of Systems  Constitutional POS: nothing reported    NEG: anorexia or night sweats   Genitourinary POS: nothing reported    NEG: dysuria or hematuria   Gastointestinal POS: nothing reported    NEG: bloating, change in bowel habits, melena or reflux symptoms   Integument POS: nothing reported    NEG: moles that are changing in size, shape, color or rashes   Breast POS: nothing reported    NEG: persistent breast lump, skin dimpling or nipple discharge         Objective   /70   Ht 167.6 cm (66\")   Wt 60.1 kg (132 lb 6.4 oz)   LMP 2021   Breastfeeding No   BMI 21.37 kg/m²     General:  well developed; well nourished  no acute distress   Skin:  Not performed.   Thyroid: not examined   Lungs:  breathing is unlabored   Heart:  Not performed.   Breasts:  Not performed.   Abdomen: soft, non-tender; no masses  no " umbilical or inguinal hernias are present  no hepato-splenomegaly   Pelvis: Clinical staff was present for exam  External genitalia:  normal appearance of the external genitalia including Bartholin's and Tingley's glands.  :  urethral meatus normal;  Vaginal:  normal pink mucosa without prolapse or lesions.  Cervix:  normal appearance.  Uterus:  normal size, shape and consistency.  Adnexa:  normal bimanual exam of the adnexa.     Lab Review   PAp Smear    Imaging   No data reviewed        Assessment   1. Positive HR HPV 18 and HR HPV non-16/18     Plan   1. Per guidelines, given the presence of HPV 18, a colposcopy is recommended. Discussed the HPV vaccine and recommended the patient reach out to her insurance company to ensure coverage. Recommend the use of condoms.  2. The importance of keeping all planned follow-up and taking all medications as prescribed was emphasized.  3. Follow up colposcopy in 2 weeks.    No orders of the defined types were placed in this encounter.         This note was electronically signed.    Shobha Bartholomew DO  April 20, 2021    Note: Speech recognition transcription software may have been used to create portions of this document.  An attempt at proofreading has been made but errors in transcription could still be present.

## 2021-05-11 ENCOUNTER — OFFICE VISIT (OUTPATIENT)
Dept: OBSTETRICS AND GYNECOLOGY | Facility: CLINIC | Age: 41
End: 2021-05-11

## 2021-05-11 VITALS
SYSTOLIC BLOOD PRESSURE: 102 MMHG | BODY MASS INDEX: 21.76 KG/M2 | DIASTOLIC BLOOD PRESSURE: 70 MMHG | WEIGHT: 135.4 LBS | HEIGHT: 66 IN

## 2021-05-11 DIAGNOSIS — Z13.9 SPECIAL SCREENING: Primary | ICD-10-CM

## 2021-05-11 DIAGNOSIS — R87.810 CERVICAL HIGH RISK HPV (HUMAN PAPILLOMAVIRUS) TEST POSITIVE: ICD-10-CM

## 2021-05-11 LAB
B-HCG UR QL: NEGATIVE
INTERNAL NEGATIVE CONTROL: NEGATIVE
INTERNAL POSITIVE CONTROL: POSITIVE
Lab: NORMAL

## 2021-05-11 PROCEDURE — 81025 URINE PREGNANCY TEST: CPT | Performed by: OBSTETRICS & GYNECOLOGY

## 2021-05-11 PROCEDURE — 57454 BX/CURETT OF CERVIX W/SCOPE: CPT | Performed by: OBSTETRICS & GYNECOLOGY

## 2021-05-11 RX ORDER — PROCHLORPERAZINE 25 MG/1
SUPPOSITORY RECTAL
COMMUNITY
Start: 2021-04-28

## 2021-05-11 NOTE — PROGRESS NOTES
Colposcopy    Date of procedure:  5/11/2021   Risks and benefits discussed? yes   All questions answered? yes   Consents given by: patient   Written consent obtained? yes   Pre-op indication: Positive HR HPV 18 and HR HPV non-16/18          Procedure documentation:  The cervix was initially viewed colposcopically through a green filter.  The cervix was next bathed in acetic acid.   The findings were as follows:    Transformation zone seen? Yes   Findings: 1. Acetowhite noted at 6 o'clock and 9 o'clock  2. HPV changes noted at 1 o'clock, 6 o'clock and 9 o'clock   Ectocervical biopsies: taken from 1 o'clock, 6 o'clock and 9 o'clock.  Monsels solution was applied to the biopsy sites.   Endocervical curettage: performed               Colposcopic Impression: 1. Mild dysplasia  2. Adequate colposcopy  3. Colposcopic findings are consistent with cytology       Plan: Will base further treatment on pathology results  Post biopsy instructions given to patient.  Specimens labelled and sent to pathology.         This note was electronically signed.    Shobha Bartholomew, DO  May 11, 2021

## 2021-05-14 PROBLEM — N87.0 MILD DYSPLASIA OF CERVIX (CIN I): Status: ACTIVE | Noted: 2021-05-14

## 2021-06-04 ENCOUNTER — LAB (OUTPATIENT)
Dept: LAB | Facility: HOSPITAL | Age: 41
End: 2021-06-04

## 2021-06-04 ENCOUNTER — OFFICE VISIT (OUTPATIENT)
Dept: FAMILY MEDICINE CLINIC | Facility: CLINIC | Age: 41
End: 2021-06-04

## 2021-06-04 VITALS
RESPIRATION RATE: 18 BRPM | DIASTOLIC BLOOD PRESSURE: 64 MMHG | HEART RATE: 90 BPM | BODY MASS INDEX: 21.28 KG/M2 | SYSTOLIC BLOOD PRESSURE: 108 MMHG | WEIGHT: 132.4 LBS | HEIGHT: 66 IN | OXYGEN SATURATION: 96 %

## 2021-06-04 DIAGNOSIS — Z11.3 SCREENING FOR STD (SEXUALLY TRANSMITTED DISEASE): ICD-10-CM

## 2021-06-04 DIAGNOSIS — R00.2 PALPITATIONS: Primary | ICD-10-CM

## 2021-06-04 DIAGNOSIS — E13.9 LADA (LATENT AUTOIMMUNE DIABETES IN ADULTS), MANAGED AS TYPE 1 (HCC): ICD-10-CM

## 2021-06-04 DIAGNOSIS — N87.0 MILD DYSPLASIA OF CERVIX (CIN I): ICD-10-CM

## 2021-06-04 PROCEDURE — G0432 EIA HIV-1/HIV-2 SCREEN: HCPCS

## 2021-06-04 PROCEDURE — 99214 OFFICE O/P EST MOD 30 MIN: CPT | Performed by: PHYSICIAN ASSISTANT

## 2021-06-04 NOTE — PROGRESS NOTES
Chief Complaint   Patient presents with   • Follow-up     HPV vaccine       HPI     Candace Kinney is a pleasant 41 y.o. female who is here for routine follow-up of HPV, LORI, and palpitations.  Patient was seen by heart and valve clinic.  Her tests were reassuring.  She reports improvement with palpitations.  Has found that watching her caffeine and getting good sleep makes a big difference for her.  She is followed by endocrinology for her LORI.  She has upcoming appointment in the next few weeks.  She was recently seen by gynecology for HPV.  Colposcopy showed low grade squamous intraepithelial lesion and she will return for follow-up with Dr. Bartholomew for repeat pap smear in 1 year.  She is wanting HPV vaccine and will check with pharmacy and health department.    Past Medical History:   Diagnosis Date   • Abnormal Pap smear of cervix    • Anemia    • Arthritis    • Depression    • Diabetes mellitus (CMS/HCC)    • Female bladder prolapse    • HPV (human papilloma virus) infection    • Hyperglycemia    • Lupus (CMS/HCC)    • Migraine        Past Surgical History:   Procedure Laterality Date   • HERNIA REPAIR     • TOOTH EXTRACTION         Family History   Problem Relation Age of Onset   • Arthritis Mother    • Lymphoma Mother    • Mental illness Sister    • Obesity Sister    • Hypertension Other    • Obesity Other    • Migraines Other    • Mental illness Father    • Migraines Father    • Hypertension Father    • Pulmonary embolism Father    • Tiff Parkinson White syndrome Father    • Arthritis Maternal Grandmother    • Liver cancer Maternal Grandmother    • No Known Problems Maternal Grandfather    • Lung cancer Paternal Grandmother         smoker   • Stroke Paternal Grandfather    • No Known Problems Brother    • Breast cancer Neg Hx    • Colon cancer Neg Hx    • Ovarian cancer Neg Hx    • Uterine cancer Neg Hx    • Endometrial cancer Neg Hx        Social History     Socioeconomic History   • Marital status:  "Single     Spouse name: Not on file   • Number of children: Not on file   • Years of education: Not on file   • Highest education level: Not on file   Tobacco Use   • Smoking status: Never Smoker   • Smokeless tobacco: Never Used   Substance and Sexual Activity   • Alcohol use: Yes     Alcohol/week: 2.0 standard drinks     Types: 2 Glasses of wine per week     Comment: occasional   • Drug use: Not Currently     Types: Marijuana   • Sexual activity: Yes     Partners: Male     Birth control/protection: OCP       Allergies   Allergen Reactions   • Macrobid [Nitrofurantoin Macrocrystal] Headache   • Nitrofurantoin Hives   • Sulfa Antibiotics Hives       ROS  Review of Systems   Constitutional: Negative for chills, diaphoresis, fatigue and fever.   Respiratory: Negative for cough, shortness of breath and wheezing.    Cardiovascular: Negative for chest pain, palpitations and leg swelling.   Genitourinary: Negative for menstrual problem.   Neurological: Negative for dizziness and headache.   Psychiatric/Behavioral: Negative for self-injury, sleep disturbance, suicidal ideas, depressed mood and stress. The patient is not nervous/anxious.        Vitals:    06/04/21 1120   BP: 108/64   Pulse: 90   Resp: 18   SpO2: 96%   Weight: 60.1 kg (132 lb 6.4 oz)   Height: 167.6 cm (65.98\")     Body mass index is 21.38 kg/m².    Current Outpatient Medications on File Prior to Visit   Medication Sig Dispense Refill   • B Complex Vitamins (B-COMPLEX/B-12 SL) Place  under the tongue.     • B-D UF III MINI PEN NEEDLES 31G X 5 MM misc      • Continuous Blood Gluc  (Dexcom G6 ) device      • Continuous Blood Gluc Sensor (Dexcom G6 Sensor)      • Continuous Blood Gluc Transmit (Dexcom G6 Transmitter) misc      • drospirenone-ethinyl estradiol (Elvia) 3-0.02 MG per tablet Take 1 tablet by mouth Daily. 28 tablet 5   • HUMALOG KWIKPEN 100 UNIT/ML solution pen-injector Inject 2 Units under the skin into the appropriate area as " directed 3 (Three) Times a Day With Meals. (Patient taking differently: Inject 2 Units under the skin into the appropriate area as directed 2 (two) times a day.)     • Lantus SoloStar 100 UNIT/ML injection pen      • metoprolol tartrate (LOPRESSOR) 25 MG tablet Take 0.5 tablets by mouth 2 (Two) Times a Day. 90 tablet 1   • ONETOUCH VERIO test strip        No current facility-administered medications on file prior to visit.       Results for orders placed or performed in visit on 05/11/21   POC Pregnancy, Urine    Specimen: Urine   Result Value Ref Range    HCG, Urine, QL Negative Negative    Lot Number [id number]     Internal Positive Control Positive     Internal Negative Control Negative        PE    Physical Exam  Vitals reviewed.   Constitutional:       General: She is not in acute distress.     Appearance: Normal appearance. She is well-developed and normal weight. She is not ill-appearing or diaphoretic.   HENT:      Head: Normocephalic and atraumatic.   Eyes:      Extraocular Movements: Extraocular movements intact.      Conjunctiva/sclera: Conjunctivae normal.   Pulmonary:      Effort: No respiratory distress.   Musculoskeletal:         General: Normal range of motion.      Cervical back: Normal range of motion.      Right lower leg: No edema.      Left lower leg: No edema.   Skin:     General: Skin is warm.      Findings: No erythema or rash.   Neurological:      General: No focal deficit present.      Mental Status: She is alert.   Psychiatric:         Attention and Perception: Attention and perception normal. She is attentive.         Mood and Affect: Mood and affect normal.         Speech: Speech normal.         Behavior: Behavior normal. Behavior is cooperative.         Thought Content: Thought content normal.         Cognition and Memory: Cognition and memory normal.         Judgment: Judgment normal.         A/P    Diagnoses and all orders for this visit:    1. Palpitations (Primary)  Evaluated by  cardiology, no concerning findings.  Palpitations have improved with better sleep quality and limiting caffeine.    2. 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    3. Mild dysplasia of cervix (JEANNINE I) -- needs repeat pap smear with co-testing in 5/2022  Followed by gynecology.  Will return in 1 year for repeat pap smear.  Will check with health department and pharmacy for HPV vaccinations.       Plan of care reviewed with patient at the conclusion of today's visit. Education was provided regarding diagnosis, management and any prescribed or recommended OTC medications.  Patient verbalizes understanding of and agreement with management plan.    Return in about 10 months (around 3/30/2022) for Annual physical.     Danelle Barraza PA-C

## 2021-06-05 LAB — HIV1+2 AB SER QL: NORMAL

## 2021-09-10 ENCOUNTER — TELEPHONE (OUTPATIENT)
Dept: FAMILY MEDICINE CLINIC | Facility: CLINIC | Age: 41
End: 2021-09-10

## 2021-09-10 DIAGNOSIS — Z30.41 ENCOUNTER FOR SURVEILLANCE OF CONTRACEPTIVE PILLS: ICD-10-CM

## 2021-09-10 RX ORDER — DROSPIRENONE AND ETHINYL ESTRADIOL 0.02-3(28)
1 KIT ORAL DAILY
Qty: 28 TABLET | Refills: 5 | Status: SHIPPED | OUTPATIENT
Start: 2021-09-10 | End: 2022-02-25 | Stop reason: SDUPTHER

## 2021-09-10 NOTE — TELEPHONE ENCOUNTER
Caller: Candace Kinney    Relationship: Self    Best call back number:     [phone]     Medication needed:   Requested Prescriptions     Pending Prescriptions Disp Refills   • drospirenone-ethinyl estradiol (Elvia) 3-0.02 MG per tablet 28 tablet 5     Sig: Take 1 tablet by mouth Daily.     When do you need the refill by:    ASAP    What additional details did the patient provide when requesting the medication:     PATIENT REQUESTED TO HAVE THIS MEDICATION REFILLED     Does the patient have less than a 3 day supply:  [] Yes  [] No    [place]    TELEPHONE CONTACT:    [phone]    [facility]

## 2021-09-10 NOTE — TELEPHONE ENCOUNTER
Rx Refill Note  Requested Prescriptions     Pending Prescriptions Disp Refills   • drospirenone-ethinyl estradiol (Elvia) 3-0.02 MG per tablet 28 tablet 5     Sig: Take 1 tablet by mouth Daily.      Last office visit with prescribing clinician: 6/4/2021      Next office visit with prescribing clinician: 4/1/2022            Rona Young MA  09/10/21, 15:20 EDT

## 2022-02-25 ENCOUNTER — TELEPHONE (OUTPATIENT)
Dept: OBSTETRICS AND GYNECOLOGY | Facility: CLINIC | Age: 42
End: 2022-02-25

## 2022-02-25 DIAGNOSIS — Z30.41 ENCOUNTER FOR SURVEILLANCE OF CONTRACEPTIVE PILLS: ICD-10-CM

## 2022-02-25 RX ORDER — DROSPIRENONE AND ETHINYL ESTRADIOL 0.02-3(28)
1 KIT ORAL DAILY
Qty: 28 TABLET | Refills: 0 | Status: SHIPPED | OUTPATIENT
Start: 2022-02-25 | End: 2022-03-21

## 2022-02-25 NOTE — TELEPHONE ENCOUNTER
[facility], FORMER FISH      PT NEEDS REFILL ON MOE 3MG-0.02MG TAB      PHARM:  [facility] IF CALLED IN TODAY.

## 2022-03-21 DIAGNOSIS — Z30.41 ENCOUNTER FOR SURVEILLANCE OF CONTRACEPTIVE PILLS: ICD-10-CM

## 2022-03-21 RX ORDER — ETHINYL ESTRADIOL/DROSPIRENONE 0.02-3(28)
TABLET ORAL
Qty: 28 TABLET | Refills: 0 | Status: SHIPPED | OUTPATIENT
Start: 2022-03-21 | End: 2022-04-19

## 2022-04-19 DIAGNOSIS — Z30.41 ENCOUNTER FOR SURVEILLANCE OF CONTRACEPTIVE PILLS: ICD-10-CM

## 2022-04-19 RX ORDER — ETHINYL ESTRADIOL/DROSPIRENONE 0.02-3(28)
TABLET ORAL
Qty: 28 TABLET | Refills: 0 | Status: SHIPPED | OUTPATIENT
Start: 2022-04-19 | End: 2022-04-22 | Stop reason: SDUPTHER

## 2022-04-21 NOTE — PROGRESS NOTES
Subjective   Chief Complaint   Patient presents with   • Annual Exam     Well woman gyn exam     Candace Kinney is a 42 y.o. year old  presenting to be seen for her annual exam. Colposcopy last May with low grade dysplasia and was recommended for repeat pap and co testing this year.  Her HPV 18 was positive as well as a low risk strain.  She is interested in HPV vaccine today.  Her last mammogram was 2021- birads 1. She moved to [place] last year and is looking to move back to [place] as her support system is all here in town. Her daughter lives in [place] with her ex  as she cannot find a physical for her annual follow-up.  She is anxious to move back to [place].  She reports when she runs she has to wear depends due to urinary leakage.  She also reports occasional pain with intercourse.  She reports urinary leakage with coughing sneezing or jumping.  She has not tried any muscle exercises for these problems.  She feels that she is prone to UTIs and yeast infections.  She denies any symptoms today.  She states her blood sugars have been under good control.    SEXUAL Hx:  She is currently sexually active.  In the past year there there has been MORE THAN ONE new sexual partner.    Condoms are always used.  She would like to be screened for STD's at today's exam.  She declines blood work.  Current birth control method: OCP (estrogen/progesterone).  She is happy with her current method of contraception and does not want to discuss alternative methods of contraception.  MENSTRUAL Hx:  Patient's last menstrual period was 2022 (approximate).  In the past 6 months her cycles have been regular, predictable and occur monthly.  Her menstrual flow is typically light.   Each month on average there are roughly 0 day(s) of very heavy flow.    Intermenstrual bleeding is absent.    Post-coital bleeding is absent.  Dysmenorrhea: mild and is not affecting her activities of daily living  PMS: none  "and is not affecting her activities of daily living  Her cycles are not a source of concern for her that she wishes to discuss today.  HEALTH Hx:  She exercises regularly: yes.  She wears her seat belt: yes.  She has concerns about domestic violence: no.  OTHER THINGS SHE WANTS TO DISCUSS TODAY:  Nothing else    The following portions of the patient's history were reviewed and updated as appropriate:problem list, current medications, allergies, past family history, past medical history, past social history and past surgical history.    Social History    Tobacco Use      Smoking status: Never Smoker      Smokeless tobacco: Never Used    Review of Systems  Constitutional POS: nothing reported    NEG: anorexia or night sweats   Genitourinary POS: EDGAR is present and it IS effecting her ADL's    NEG: dysuria or hematuria      Gastointestinal POS: nothing reported    NEG: bloating, change in bowel habits, melena or reflux symptoms   Integument POS: nothing reported    NEG: moles that are changing in size, shape, color or rashes   Breast POS: nothing reported    NEG: persistent breast lump, skin dimpling or nipple discharge                                        Objective   /60 (BP Location: Right arm, Patient Position: Sitting, Cuff Size: Adult)   Ht 167.6 cm (66\")   Wt 59 kg (130 lb)   LMP 03/25/2022 (Approximate)   Breastfeeding No   BMI 20.98 kg/m²     General:  well developed; well nourished  no acute distress   Skin:  No suspicious lesions seen   Thyroid: normal to inspection and palpation   Breasts:  Examined in supine position  Symmetric without masses or skin dimpling  Nipples normal without inversion, lesions or discharge  There are no palpable axillary nodes   Abdomen: soft, non-tender; no masses  no umbilical or inguinal hernias are present  no hepato-splenomegaly   Pelvis: Clinical staff was present for exam  :  urethral meatus normal;  Vaginal:  she is not able to perform a Kegel contraction upon " request; Normal pink mucosa.  Vaginal laxity noted.  Cervix:  normal appearance.  Uterus:  normal size, shape and consistency.  Adnexa:  normal bimanual exam of the adnexa.  Rectal:  digital rectal exam not performed; anus visually normal appearing.        Assessment   1. Well woman gynecological exam  2. Genitourinary prolapse  3. History of high risk HPV infection     Plan     1. Pap was done today.  If she does not receive the results of the Pap within 2 weeks  time, she was instructed to call to find out the results.  I explained to Candace that because she is being seen in follow-up of a previous HPV high risk +18.  Discussed timing of her next Pap will be based upon today's results.  Also discussed the importance of monthly self breast exams.  Yearly screening mammogram scheduled.  2. Prescription(s) for OCP's were refilled today   3. The importance of keeping all planned follow-up and taking all medications as prescribed was emphasized.  4. Follow up for annual exam 1 year   5. Will send referral for pelvic floor physical therapy.  6. First dose of the HPV vaccine series given while in office today.  Return to care in 2 months for next injection.    No orders of the defined types were placed in this encounter.         This note was electronically signed.    Linda King, ANGÉLICA  April 22, 2022

## 2022-04-22 ENCOUNTER — OFFICE VISIT (OUTPATIENT)
Dept: OBSTETRICS AND GYNECOLOGY | Facility: CLINIC | Age: 42
End: 2022-04-22

## 2022-04-22 VITALS
BODY MASS INDEX: 20.89 KG/M2 | DIASTOLIC BLOOD PRESSURE: 60 MMHG | HEIGHT: 66 IN | WEIGHT: 130 LBS | SYSTOLIC BLOOD PRESSURE: 106 MMHG

## 2022-04-22 DIAGNOSIS — N81.89 OTHER FEMALE GENITAL PROLAPSE: Primary | ICD-10-CM

## 2022-04-22 DIAGNOSIS — Z01.419 WELL WOMAN EXAM WITH ROUTINE GYNECOLOGICAL EXAM: ICD-10-CM

## 2022-04-22 DIAGNOSIS — R00.2 PALPITATIONS: ICD-10-CM

## 2022-04-22 DIAGNOSIS — Z30.41 ENCOUNTER FOR SURVEILLANCE OF CONTRACEPTIVE PILLS: ICD-10-CM

## 2022-04-22 PROCEDURE — 99396 PREV VISIT EST AGE 40-64: CPT | Performed by: NURSE PRACTITIONER

## 2022-04-22 PROCEDURE — 90471 IMMUNIZATION ADMIN: CPT | Performed by: NURSE PRACTITIONER

## 2022-04-22 PROCEDURE — 90649 4VHPV VACCINE 3 DOSE IM: CPT | Performed by: NURSE PRACTITIONER

## 2022-04-22 RX ORDER — MULTIPLE VITAMINS W/ MINERALS TAB 9MG-400MCG
1 TAB ORAL DAILY
COMMUNITY

## 2022-04-22 RX ORDER — DROSPIRENONE AND ETHINYL ESTRADIOL 0.02-3(28)
1 KIT ORAL DAILY
Qty: 84 TABLET | Refills: 4 | Status: SHIPPED | OUTPATIENT
Start: 2022-04-22

## 2022-04-26 DIAGNOSIS — Z12.31 SCREENING MAMMOGRAM, ENCOUNTER FOR: Primary | ICD-10-CM

## 2022-04-26 DIAGNOSIS — Z01.419 WELL WOMAN EXAM WITH ROUTINE GYNECOLOGICAL EXAM: Primary | ICD-10-CM

## 2022-04-27 PROBLEM — Z01.419 WELL WOMAN EXAM WITH ROUTINE GYNECOLOGICAL EXAM: Status: ACTIVE | Noted: 2022-04-27

## 2022-05-27 ENCOUNTER — TELEPHONE (OUTPATIENT)
Dept: OBSTETRICS AND GYNECOLOGY | Facility: CLINIC | Age: 42
End: 2022-05-27

## 2022-05-27 DIAGNOSIS — N89.8 VAGINAL IRRITATION: Primary | ICD-10-CM

## 2022-05-27 RX ORDER — FLUCONAZOLE 150 MG/1
150 TABLET ORAL DAILY
Qty: 2 TABLET | Refills: 0 | Status: SHIPPED | OUTPATIENT
Start: 2022-05-27 | End: 2022-05-29

## 2022-07-21 PROBLEM — R39.9 UTI SYMPTOMS: Status: ACTIVE | Noted: 2017-12-11

## 2022-07-22 ENCOUNTER — OFFICE VISIT (OUTPATIENT)
Dept: FAMILY MEDICINE CLINIC | Facility: CLINIC | Age: 42
End: 2022-07-22

## 2022-07-22 VITALS
BODY MASS INDEX: 21.69 KG/M2 | WEIGHT: 135 LBS | HEIGHT: 66 IN | SYSTOLIC BLOOD PRESSURE: 112 MMHG | DIASTOLIC BLOOD PRESSURE: 62 MMHG

## 2022-07-22 DIAGNOSIS — E11.9 DIABETES MELLITUS WITHOUT COMPLICATION: ICD-10-CM

## 2022-07-22 DIAGNOSIS — E13.9 LADA (LATENT AUTOIMMUNE DIABETES IN ADULTS), MANAGED AS TYPE 1: ICD-10-CM

## 2022-07-22 DIAGNOSIS — M54.2 CERVICALGIA: Primary | ICD-10-CM

## 2022-07-22 LAB
EXPIRATION DATE: NORMAL
HBA1C MFR BLD: 6 %
Lab: NORMAL

## 2022-07-22 PROCEDURE — 99214 OFFICE O/P EST MOD 30 MIN: CPT | Performed by: INTERNAL MEDICINE

## 2022-07-22 PROCEDURE — 82043 UR ALBUMIN QUANTITATIVE: CPT | Performed by: INTERNAL MEDICINE

## 2022-07-22 PROCEDURE — 83036 HEMOGLOBIN GLYCOSYLATED A1C: CPT | Performed by: INTERNAL MEDICINE

## 2022-07-22 RX ORDER — BACLOFEN 5 MG/1
1 TABLET ORAL 3 TIMES DAILY
Qty: 60 TABLET | Refills: 1 | Status: SHIPPED | OUTPATIENT
Start: 2022-07-22

## 2022-07-22 RX ORDER — FLUCONAZOLE 150 MG/1
TABLET ORAL
COMMUNITY
Start: 2022-05-29 | End: 2022-10-14

## 2022-07-22 NOTE — ASSESSMENT & PLAN NOTE
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  I recommended physical therapy.  I recommended stretching exercises and provided these.  Prescribed baclofen 5 mg p.o. 3 times daily as needed.  Ice 20 minutes on 20 minutes off.  Should she develop radicular pain numbness or tingling imaging would be warranted.

## 2022-07-22 NOTE — ASSESSMENT & PLAN NOTE
Diabetes is improving with treatment.   Continue current treatment regimen.  Reminded to get yearly retinal exam.  Endocrinology clinic referral.  Diabetes will be reassessed sHe is typically followed at [facility] for diabetes..

## 2022-07-22 NOTE — PROGRESS NOTES
"Candace Kinney  1980  [id number]  Patient Care Team:  Danelle Barraza PA-C as PCP - General (Physician Assistant)  Evelia Burgos MD as Consulting Physician (Internal Medicine)  Victor Manuel Jean-Baptiste MD as Consulting Physician (Rheumatology)    Candace Kinney is a 42 y.o. female here today for follow up.     This patient is accompanied by their self who contributes to the history of their care.    Chief Complaint:    Chief Complaint   Patient presents with   • Neck Pain     Started about two or three months ago. Starts on the Rt side in the shoulder. Hears a \"crunch sound\"         History of Present Illness:  I have reviewed and/or updated the patient's past medical, past surgical, family, social history, problem list and allergies as appropriate.     This is  a 42-year-old female who presents with a new problem. She reports approximately 2 to 3-month history of neck pain.  Pain is predominantly in the right shoulder. Pain gets worse during the night. Pain can radiate up over ear and into right temple. She does work at a computer. Has taken ibuprofen and alleve- this does help. Pain can radiate into right shoulder- not arm and denies numbness or tingling.  No recent trauma.     Review of Systems   Constitutional: Negative for fatigue.   Eyes: Negative.    Cardiovascular: Negative.    Endocrine: Negative for polydipsia and polyuria.   Musculoskeletal: Positive for arthralgias and neck pain.   Neurological: Negative for weakness and numbness.       Vitals:    07/22/22 1416   BP: 112/62   Weight: 61.2 kg (135 lb)   Height: 167.6 cm (65.98\")   PainSc:   4   PainLoc: Neck     Body mass index is 21.8 kg/m².    Physical Exam    Procedures    Results Review:    I reviewed the patient's new clinical results.  Her A1c is 6.0.  Urine microalbumin collected.    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   Continue current treatment regimen.  Reminded to get yearly retinal exam.  Endocrinology clinic referral.  Diabetes will be reassessed sHe is typically followed at [facility] for diabetes..           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  I recommended physical therapy.  I recommended stretching exercises and provided these.  Prescribed baclofen 5 mg p.o. 3 times daily as needed.  Ice 20 minutes on 20 minutes off.  Should she develop radicular pain numbness or tingling imaging would be warranted.           Relevant Orders    Ambulatory Referral to Physical Therapy Evaluate and treat      Other Visit Diagnoses     Diabetes mellitus without complication (HCC)        Relevant Orders    POC Glycosylated Hemoglobin (Hb A1C) (Completed)    MicroAlbumin, Urine, Random - Urine, Clean Catch          Plan of care reviewed with patient at the conclusion of today's visit. Education was provided regarding diagnosis and management.  Patient verbalizes understanding of and agreement with management plan.    Return in about 8 weeks (around 9/16/2022) for pema.    Edwin Cottrell MD      Please note than portions of this note were completed [facility] a Voice Recognition Program

## 2022-07-23 LAB — ALBUMIN UR-MCNC: <1.2 MG/DL

## 2022-09-19 ENCOUNTER — TREATMENT (OUTPATIENT)
Dept: PHYSICAL THERAPY | Facility: CLINIC | Age: 42
End: 2022-09-19

## 2022-09-19 DIAGNOSIS — M54.2 PAIN, NECK: Primary | ICD-10-CM

## 2022-09-19 PROCEDURE — 97110 THERAPEUTIC EXERCISES: CPT | Performed by: PHYSICAL THERAPIST

## 2022-09-19 PROCEDURE — 97162 PT EVAL MOD COMPLEX 30 MIN: CPT | Performed by: PHYSICAL THERAPIST

## 2022-09-19 NOTE — PROGRESS NOTES
Physical Therapy Initial Evaluation and Plan of Care      Patient: Candace Kinney   : 1980  Diagnosis/ICD-10 Code:  The encounter diagnosis was Pain, neck.   Referring practitioner: Edwin Cottrell MD  Date of Initial Visit: Type: THERAPY  Noted: 2022  Today's Date: 2022  Patient seen for 1 sessions         Visit Diagnoses:    ICD-10-CM ICD-9-CM   1. Pain, neck  M54.2 723.1       Subjective Questionnaire: NDI:22%    Subjective Evaluation    History of Present Illness  Onset date: about 6 months ago.  Mechanism of injury: Pt reports 6 month history of neck pain with gradual onset, no particular injury or incident.    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  Aggravating factors: lifting, keyboarding, prolonged positioning and movement (worse at end of day)  Progression: worsening    Hand dominance: right    Diagnostic Tests  No diagnostic tests performed    Treatments  Previous treatment: medication  Patient Goals  Patient goals for therapy: decreased pain           Treatment  Exercise 1  Exercise Name 1: Initial HEP education provided today         Functional Testing  Functional Tests Options: Neck Disability Index (NDI)   Objective          Postural Observations  Seated posture: fair  Standing posture: fair  Correction of posture: makes symptoms better    Additional Postural Observation Details  FHP, increased TL lordosis (pt overcorrecting posture)    Palpation     Additional Palpation Details  Increased mm tension in lateral cervical and supraclavicular mm.    Neurological Testing     Sensation   Cervical/Thoracic   Left   Intact: light touch    Right   Intact: light touch    Additional Neurological Details  No symptoms reported distal to shoulder.    Active Range of Motion      Additional Active Range of Motion Details  Limited segmental cervical flexion, tightness and discomfort reported with all movements.    Passive Range of Motion   Cervical/Thoracic Spine   Cervical   Subcranial retraction: restricted     Strength/Myotome Testing   Cervical Spine     Left   Normal strength    Right   Normal strength    Additional Strength Details  Weakness in DNF          Assessment & Plan     Assessment  Impairments: abnormal or restricted ROM, activity intolerance, impaired physical strength, lacks appropriate home exercise program and pain with function  Functional Limitations: carrying objects, lifting, sleeping, uncomfortable because of pain and sitting  Assessment details: Pt presents with postural deficits and muscle imbalance contributing to symptoms.  She will benefit from PT intervention to address pain and noted deficits in posture, flexibility, and postural strength.  Prognosis: good    Goals  Plan Goals: 4 weeks   1) Pt demonstrates independence and compliance with initial HEP.  2) Pt reports reduction in intensity of neck pain to no worse than 3/10 on NPRS.  3) Pt demonstrates improving postural alignment and awareness.  4) Pt demonstrates CROM without increase in pain.    8 weeks  1) Pt demonstrates independence in advanced HEP and self-management strategies to prevent/reduce symptom recurrence.  2) Pt reports pain is not constant, is no worse than 2/10, and is relieved by performance of prescribed HEP.  3) CROM is WNL's without increase in pain, and not limiting to functional abilities.  4) NDI score is improved to <20%, indicating improving functional abilities.  5) Pt demonstrates increasing strength in postural stabilizers, deep neck flexors.      Plan  Therapy options: will be seen for skilled therapy services  Planned modality interventions: cryotherapy and thermotherapy (hydrocollator packs)  Planned therapy interventions: manual therapy, neuromuscular re-education, postural  training, soft tissue mobilization, spinal/joint mobilization, strengthening, stretching, therapeutic activities, joint mobilization, home exercise program, functional ROM exercises, flexibility and body mechanics training  Frequency: 1x week  Duration in weeks: 8  Treatment plan discussed with: patient  Plan details: PT weekly for up to 8 weeks, addressing pain and noted deficits.        Timed:  Manual Therapy:         mins  57224;  Therapeutic Exercise:    10     mins  64661;     Neuromuscular Mason:        mins  84794;    Therapeutic Activity:          mins  03164;     Gait Training:           mins  26775;     Ultrasound:          mins  17561;    Electrical Stimulation:         mins  46628 ( );  Iontophoresis  ___ mins   65499    Untimed:  Electrical Stimulation:         mins  03245 ( );  Mechanical Traction:         mins  55454;     Timed Treatment:   10   mins   Total Treatment:     40   mins    PT SIGNATURE: Marina Subramanian, PT   Electronically signed  DATE TREATMENT INITIATED: 9/20/2022    Initial Certification  Certification Period: 9/20/20226401iqig98u12/18/2022  I certify that the therapy services are furnished while this patient is under my care.  The services outlined above are required by this patient, and will be reviewed every 90 days.    Physician Signature:_____________________________________________             PHYSICIAN: Edwin Cottrell MD  NPI: [id number]                                      DATE:       Please sign and return via fax to [phone].. Thank you, [facility].

## 2022-10-14 ENCOUNTER — OFFICE VISIT (OUTPATIENT)
Dept: FAMILY MEDICINE CLINIC | Facility: CLINIC | Age: 42
End: 2022-10-14

## 2022-10-14 VITALS
SYSTOLIC BLOOD PRESSURE: 120 MMHG | TEMPERATURE: 98.2 F | BODY MASS INDEX: 21.24 KG/M2 | WEIGHT: 132.2 LBS | OXYGEN SATURATION: 98 % | HEIGHT: 66 IN | HEART RATE: 111 BPM | DIASTOLIC BLOOD PRESSURE: 68 MMHG

## 2022-10-14 DIAGNOSIS — N89.8 VAGINAL DISCHARGE: ICD-10-CM

## 2022-10-14 DIAGNOSIS — M32.9 LUPUS: ICD-10-CM

## 2022-10-14 DIAGNOSIS — Z23 NEED FOR TDAP VACCINATION: ICD-10-CM

## 2022-10-14 DIAGNOSIS — B37.9 YEAST INFECTION: ICD-10-CM

## 2022-10-14 DIAGNOSIS — Z13.220 SCREENING FOR CHOLESTEROL LEVEL: ICD-10-CM

## 2022-10-14 DIAGNOSIS — M54.2 CERVICALGIA: ICD-10-CM

## 2022-10-14 DIAGNOSIS — E13.9 LADA (LATENT AUTOIMMUNE DIABETES IN ADULTS), MANAGED AS TYPE 1: ICD-10-CM

## 2022-10-14 DIAGNOSIS — Z13.29 SCREENING FOR THYROID DISORDER: ICD-10-CM

## 2022-10-14 DIAGNOSIS — R00.0 TACHYCARDIA: ICD-10-CM

## 2022-10-14 DIAGNOSIS — R00.2 PALPITATIONS: ICD-10-CM

## 2022-10-14 DIAGNOSIS — Z13.1 SCREENING FOR DIABETES MELLITUS: ICD-10-CM

## 2022-10-14 DIAGNOSIS — Z00.00 PHYSICAL EXAM, ANNUAL: Primary | ICD-10-CM

## 2022-10-14 DIAGNOSIS — Z13.0 SCREENING FOR DEFICIENCY ANEMIA: ICD-10-CM

## 2022-10-14 PROBLEM — Z78.9 USES BIRTH CONTROL: Status: ACTIVE | Noted: 2017-12-11

## 2022-10-14 PROCEDURE — 87798 DETECT AGENT NOS DNA AMP: CPT | Performed by: PHYSICIAN ASSISTANT

## 2022-10-14 PROCEDURE — 87661 TRICHOMONAS VAGINALIS AMPLIF: CPT | Performed by: PHYSICIAN ASSISTANT

## 2022-10-14 PROCEDURE — 87801 DETECT AGNT MULT DNA AMPLI: CPT | Performed by: PHYSICIAN ASSISTANT

## 2022-10-14 PROCEDURE — 99396 PREV VISIT EST AGE 40-64: CPT | Performed by: PHYSICIAN ASSISTANT

## 2022-10-14 PROCEDURE — 90715 TDAP VACCINE 7 YRS/> IM: CPT | Performed by: PHYSICIAN ASSISTANT

## 2022-10-14 PROCEDURE — 90471 IMMUNIZATION ADMIN: CPT | Performed by: PHYSICIAN ASSISTANT

## 2022-10-14 RX ORDER — INSULIN GLARGINE 100 [IU]/ML
10 INJECTION, SOLUTION SUBCUTANEOUS
COMMUNITY
Start: 2022-10-13 | End: 2023-10-13

## 2022-10-14 RX ORDER — INSULIN GLARGINE 100 [IU]/ML
INJECTION, SOLUTION SUBCUTANEOUS
COMMUNITY
Start: 2022-08-15 | End: 2022-10-14

## 2022-10-14 NOTE — PROGRESS NOTES
Patient Care Team:  Danelle Barraza PA-C as PCP - General (Physician Assistant)  Evelia Burgos MD as Consulting Physician (Internal Medicine)  Victor Manuel Jean-Baptiste MD as Consulting Physician (Rheumatology)  Linda King CNM as Midwife (Certified Nurse Midwife)     Chief complaint: Patient is in today for a physical     Candace MAU Kinney is a 42 y.o. female who presents for her yearly physical exam.     Patient presents for annual physical exam and for management of cervicalgia, LORI, lupus and palpitations.  Patient was living in [place] last year and recently moved back to [place].  She is followed by endocrinology for her LORI and was seen yesterday.  Hemoglobin AIC was 5.9%.  She is compliant on all medications.  She needs to re-establish with Dr. Jean-Baptiste and will call.  Last seen 2 years ago at his office.  She is established with gynecology.  She reports episodic neck pain that has improved after going to physical therapy and taking Baclofen.  She has ongoing pain at times.  She is trying to work on posture to help with her neck pain.       Review of Systems   Constitutional: Positive for fatigue. Negative for chills, diaphoresis and fever.   HENT: Negative for congestion, ear pain, hearing loss, postnasal drip, rhinorrhea and sore throat.    Eyes: Negative for blurred vision and pain.   Respiratory: Negative for cough, shortness of breath and wheezing.    Cardiovascular: Positive for palpitations. Negative for chest pain and leg swelling.   Gastrointestinal: Negative for abdominal pain, blood in stool, constipation, diarrhea, nausea, vomiting and indigestion.   Endocrine: Negative for polyuria.   Genitourinary: Negative for dysuria, flank pain and hematuria.   Musculoskeletal: Positive for myalgias and neck pain. Negative for arthralgias and gait problem.   Skin: Negative for rash and skin lesions.   Neurological: Negative for dizziness and headache.   Psychiatric/Behavioral: Positive  for stress. Negative for self-injury, sleep disturbance, suicidal ideas and depressed mood. The patient is not nervous/anxious.         History  Past Medical History:   Diagnosis Date   • Abnormal Pap smear of cervix    • Anemia    • Arthritis    • Depression    • Diabetes mellitus (HCC)    • Female bladder prolapse    • HPV (human papilloma virus) infection    • Hyperglycemia    • Lupus (HCC)    • Migraine       Past Surgical History:   Procedure Laterality Date   • HERNIA REPAIR     • TOOTH EXTRACTION        Allergies   Allergen Reactions   • Macrobid [Nitrofurantoin Macrocrystal] Headache   • Nitrofurantoin Hives   • Sulfa Antibiotics Hives      Family History   Problem Relation Age of Onset   • Cancer Mother    • Arthritis Mother    • Lymphoma Mother    • Mental illness Father    • Migraines Father    • Hypertension Father    • Pulmonary embolism Father    • Tiff Parkinson White syndrome Father    • Mental illness Sister    • Obesity Sister    • No Known Problems Brother    • Arthritis Maternal Grandmother    • Liver cancer Maternal Grandmother    • No Known Problems Maternal Grandfather    • Lung cancer Paternal Grandmother         smoker   • Stroke Paternal Grandfather    • Hypertension Other    • Obesity Other    • Migraines Other    • Breast cancer Neg Hx    • Colon cancer Neg Hx    • Ovarian cancer Neg Hx    • Uterine cancer Neg Hx    • Endometrial cancer Neg Hx       Social History     Socioeconomic History   • Marital status: Single   Tobacco Use   • Smoking status: Never   • Smokeless tobacco: Never   Vaping Use   • Vaping Use: Never used   Substance and Sexual Activity   • Alcohol use: Yes     Alcohol/week: 2.0 standard drinks     Types: 2 Glasses of wine per week     Comment: occasional   • Drug use: Not Currently     Types: Marijuana   • Sexual activity: Yes     Partners: Male     Birth control/protection: OCP      Current Outpatient Medications on File Prior to Visit   Medication Sig Dispense Refill    • B Complex Vitamins (B-COMPLEX/B-12 SL) Place  under the tongue.     • B-D UF III MINI PEN NEEDLES 31G X 5 MM misc      • Baclofen 5 MG tablet Take 1 tablet by mouth 3 (Three) Times a Day. 60 tablet 1   • Continuous Blood Gluc  (Dexcom G6 ) device      • Continuous Blood Gluc Sensor (Dexcom G6 Sensor)      • Continuous Blood Gluc Transmit (Dexcom G6 Transmitter) misc      • drospirenone-ethinyl estradiol (Elvia) 3-0.02 MG per tablet Take 1 tablet by mouth Daily. 84 tablet 4   • HUMALOG KWIKPEN 100 UNIT/ML solution pen-injector Inject 2 Units under the skin into the appropriate area as directed 3 (Three) Times a Day With Meals. (Patient taking differently: Inject 2 Units under the skin into the appropriate area as directed.)     • Insulin Glargine (BASAGLAR KWIKPEN) 100 UNIT/ML injection pen Inject 10 Units under the skin into the appropriate area as directed.     • multivitamin with minerals tablet tablet Take 1 tablet by mouth Daily.     • [DISCONTINUED] fluconazole (DIFLUCAN) 150 MG tablet      • [DISCONTINUED] Insulin Glargine Solostar 100 UNIT/ML solution pen-injector      • [DISCONTINUED] metoprolol tartrate (LOPRESSOR) 25 MG tablet Take 0.5 tablets by mouth 2 (Two) Times a Day. 90 tablet 1     No current facility-administered medications on file prior to visit.       Results for orders placed or performed in visit on 07/22/22   MicroAlbumin, Urine, Random - Urine, Clean Catch    Specimen: Urine, Clean Catch   Result Value Ref Range    Microalbumin, Urine <1.2 mg/dL   POC Glycosylated Hemoglobin (Hb A1C)    Specimen: Blood   Result Value Ref Range    Hemoglobin A1C 6.0 %    Lot Number [id number]     Expiration Date 02/04/2024        Health Maintenance   Topic Date Due   • Hepatitis B (1 of 3 - 3-dose series) Never done   • DIABETIC EYE EXAM  Never done   • DIABETIC FOOT EXAM  10/17/2020   • Pneumococcal Vaccine 0-64 (2 - PCV) 07/07/2021   • COVID-19 Vaccine (4 - Booster for Moderna series)  "03/07/2022   • INFLUENZA VACCINE  08/01/2022   • HEMOGLOBIN A1C  04/13/2023   • URINE MICROALBUMIN  07/22/2023   • ANNUAL PHYSICAL  10/15/2023   • PAP SMEAR  04/22/2025   • COLORECTAL CANCER SCREENING  08/13/2030   • TDAP/TD VACCINES (3 - Td or Tdap) 10/14/2032   • HEPATITIS C SCREENING  Completed       Immunization History   Administered Date(s) Administered   • COVID-19 (MODERNA) 1st, 2nd, 3rd Dose Only 01/17/2021, 02/14/2021, 01/10/2022   • Flu Vaccine Quad PF >36MO 11/15/2018, 10/03/2019   • Flu Vaccine Split Quad 11/08/2020   • [place]ock Quad =>18yrs 11/08/2020   • HPV Quadrivalent 04/22/2022   • Hepatitis A 06/26/2003, 11/15/2018, 07/07/2020   • Influenza, Unspecified 10/17/2019   • Pneumococcal Polysaccharide (PPSV23) 07/07/2020   • Td 12/19/2003   • Tdap 10/14/2022   • flucelvax quad pfs =>4 YRS 10/17/2019       BMI is within normal parameters. No other follow-up for BMI required.      Results for orders placed or performed in visit on 07/22/22   MicroAlbumin, Urine, Random - Urine, Clean Catch    Specimen: Urine, Clean Catch   Result Value Ref Range    Microalbumin, Urine <1.2 mg/dL   POC Glycosylated Hemoglobin (Hb A1C)    Specimen: Blood   Result Value Ref Range    Hemoglobin A1C 6.0 %    Lot Number [id number]     Expiration Date 02/04/2024             Vitals:    10/14/22 1311   BP: 120/68   BP Location: Left arm   Patient Position: Sitting   Cuff Size: Adult   Pulse: 111   Temp: 98.2 °F (36.8 °C)   TempSrc: Oral   SpO2: 98%   Weight: 60 kg (132 lb 3.2 oz)   Height: 167.6 cm (65.98\")   PainSc:   4   PainLoc: Neck       Body mass index is 21.35 kg/m².    Physical Exam  Vitals reviewed.   Constitutional:       General: She is not in acute distress.     Appearance: Normal appearance. She is well-developed and normal weight. She is not ill-appearing or diaphoretic.   HENT:      Head: Normocephalic and atraumatic.      Right Ear: Hearing, tympanic membrane, ear canal and external ear normal.      Left Ear: " Hearing, tympanic membrane, ear canal and external ear normal.      Nose: Nose normal.      Right Sinus: No maxillary sinus tenderness or frontal sinus tenderness.      Left Sinus: No maxillary sinus tenderness or frontal sinus tenderness.      Mouth/Throat:      Pharynx: Uvula midline.   Eyes:      General: Lids are normal.      Extraocular Movements: Extraocular movements intact.      Conjunctiva/sclera: Conjunctivae normal.   Neck:      Thyroid: No thyroid mass or thyromegaly.      Trachea: Trachea and phonation normal.   Cardiovascular:      Rate and Rhythm: Normal rate and regular rhythm.      Heart sounds: Normal heart sounds.   Pulmonary:      Effort: Pulmonary effort is normal.      Breath sounds: Normal breath sounds.   Abdominal:      General: Bowel sounds are normal. There is no distension.      Palpations: Abdomen is soft. Abdomen is not rigid.      Tenderness: There is no abdominal tenderness. There is no guarding.   Musculoskeletal:         General: Normal range of motion.      Cervical back: Normal range of motion.      Right lower leg: No edema.      Left lower leg: No edema.   Lymphadenopathy:      Cervical: No cervical adenopathy.      Right cervical: No superficial cervical adenopathy.     Left cervical: No superficial cervical adenopathy.   Skin:     General: Skin is warm.      Findings: No erythema or rash.      Nails: There is no clubbing.   Neurological:      Mental Status: She is alert and oriented to person, place, and time.      Coordination: Coordination normal.      Gait: Gait normal.      Deep Tendon Reflexes: Reflexes are normal and symmetric.      Comments: CN grossly intact   Psychiatric:         Attention and Perception: Attention and perception normal. She is attentive.         Mood and Affect: Mood and affect normal.         Speech: Speech normal.         Behavior: Behavior normal. Behavior is cooperative.         Thought Content: Thought content normal.         Cognition and  Memory: Cognition and memory normal.         Judgment: Judgment normal.             Counseling provided on diet and nutrition, exercise, supplements and flu prevention.    Diagnoses and all orders for this visit:    1. Physical exam, annual (Primary)  -     CBC Auto Differential; Future  -     Comprehensive Metabolic Panel; Future  -     TSH Rfx On Abnormal To Free T4; Future  -     Lipid Panel; Future  PE completed  Preventative labs ordered  Mammogram - overdue, patient will call  Gynecologist - established, goes regularly  Dentist - encouraged to go regularly  Ophthalmologist - encouraged to go regularly  Dermatologist - encouraged to go regularly  Vaccinations discussed    2. 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    3. Lupus ([facility])  Patient will call to schedule with Dr. Jean-Baptiste.    4. Palpitations  Stopped metoprolol.  Has had a few episodes, nothing severe.    5. Tachycardia  111 today  Patient to monitor at home and call if greater than 100 on a regular basis.    6. Cervicalgia  Improved slightly with physical therapy.  She has been working on posture and home PT exercises.  Uses ibuprofen and baclofen as needed.    7. Screening for deficiency anemia  -     CBC Auto Differential; Future    8. Screening for thyroid disorder  -     TSH Rfx On Abnormal To Free T4; Future    9. Screening for cholesterol level  -     Lipid Panel; Future    10. Screening for diabetes mellitus  -     Comprehensive Metabolic Panel; Future       Danelle Barraza PA-C   10/14/2022   20:09 EDT

## 2022-10-20 LAB
A VAGINAE DNA VAG QL NAA+PROBE: ABNORMAL SCORE
BVAB2 DNA VAG QL NAA+PROBE: ABNORMAL SCORE
C ALBICANS DNA VAG QL NAA+PROBE: POSITIVE
C GLABRATA DNA VAG QL NAA+PROBE: NEGATIVE
MEGA1 DNA VAG QL NAA+PROBE: ABNORMAL SCORE
T VAGINALIS DNA VAG QL NAA+PROBE: NEGATIVE

## 2022-10-20 RX ORDER — FLUCONAZOLE 150 MG/1
TABLET ORAL
Qty: 2 TABLET | Refills: 0 | Status: SHIPPED | OUTPATIENT
Start: 2022-10-20

## 2022-10-28 ENCOUNTER — CLINICAL SUPPORT (OUTPATIENT)
Dept: FAMILY MEDICINE CLINIC | Facility: CLINIC | Age: 42
End: 2022-10-28

## 2022-10-28 DIAGNOSIS — Z23 IMMUNIZATION DUE: Primary | ICD-10-CM

## 2022-10-28 PROCEDURE — 0124A PR ADM SARSCOV2 30MCG/0.3ML BST: CPT | Performed by: PHYSICIAN ASSISTANT

## 2022-10-28 PROCEDURE — 91312 COVID-19 (PFIZER) BIVALENT BOOSTER 12+YRS: CPT | Performed by: PHYSICIAN ASSISTANT

## 2022-11-10 ENCOUNTER — TREATMENT (OUTPATIENT)
Dept: PHYSICAL THERAPY | Facility: CLINIC | Age: 42
End: 2022-11-10

## 2022-11-10 DIAGNOSIS — M54.2 PAIN, NECK: Primary | ICD-10-CM

## 2022-11-10 PROCEDURE — 97112 NEUROMUSCULAR REEDUCATION: CPT | Performed by: PHYSICAL THERAPIST

## 2022-11-10 PROCEDURE — 97110 THERAPEUTIC EXERCISES: CPT | Performed by: PHYSICAL THERAPIST

## 2022-11-10 PROCEDURE — 97140 MANUAL THERAPY 1/> REGIONS: CPT | Performed by: PHYSICAL THERAPIST

## 2022-11-10 NOTE — PROGRESS NOTES
"      Physical Therapy Progress Note           [address] [facility], [address]         [place], [place], [address]        Patient: Candace Kinney   : 1980  Diagnosis/ICD-10 Code:  The encounter diagnosis was Pain, neck.   Referring practitioner: Edwin Cottrell MD  Date of Initial Visit: Type: THERAPY  Noted: 2022  Today's Date: 11/10/2022  Visit:  2     Candace Kinney reports: pain in R side of neck comes and goes, but is always there.  She has occasional R sided headaches on her \"bad\" days.  She describes a pulling and achyness in R side of neck and across top of shoulder.  She does not report any UE symptoms distal to shoulders.  Symptoms improve with massage, heat, stretching, postural correction, and with topical ointments or oral medication.  She notices some improvement in symptoms since her initial PT visit in September.    Subjective     Treatment  Pre-treatment pain:  2  Post-treatment pain:  2  (pain at best: 1/10, pain at worst: 7/10 occasionally)  Pain is worse at night/end of day and in the mornings.    Exercise 1  Exercise Name 1: Reassessment completed  Exercise 2  Exercise Name 2: HEP reviewed and progressed  Exercise 3  Exercise Name 3: postural advice provided including taking brief but frequent stretch breaks between clients during work day  Exercise 4  Exercise Name 4: B shoulde rER with scap squeeze-with and without band  Equipment/Resistance 4: yellow band    Manual Rx 1  Manual Rx 1 Location: cervical spine  Manual Rx 1 Type: suboccipital release, OA flexion, B sideglides, passive cervical retraction        Objective          Postural Observations  Seated posture: poor  Correction of posture: makes symptoms better        Neurological Testing     Sensation   Cervical/Thoracic   Left   Intact: light touch    Right   Intact: light touch    Active Range of Motion   Cervical/Thoracic Spine   Cervical    Subcranial retraction: restricted     Additional Active Range of Motion " Details  PT lacks segmental cervical flexion.    L>R side glide is more restricted than  R>L    Strength/Myotome Testing     Additional Strength Details  No focal strength deficits    Tests   Cervical   Deep neck flexor endurance test (sec): unable to perform with correct form.        Assessment & Plan     Assessment    Assessment details: Pt had unintended lapse in treatment related to location change of provider combined with her own scheduling limitations.  She will benefit from resuming care to address muscle imbalance issues contributing to symptoms.    Goals  Plan Goals: Plan Goals: 4 weeks-ongoing   1) Pt demonstrates independence and compliance with initial HEP.  2) Pt reports reduction in intensity of neck pain to no worse than 3/10 on NPRS.  3) Pt demonstrates improving postural alignment and awareness.  4) Pt demonstrates CROM without increase in pain.    8 weeks-ongoing  1) Pt demonstrates independence in advanced HEP and self-management strategies to prevent/reduce symptom recurrence.  2) Pt reports pain is not constant, is no worse than 2/10, and is relieved by performance of prescribed HEP.  3) CROM is WNL's without increase in pain, and not limiting to functional abilities.  4) NDI score is improved to <20%, indicating improving functional abilities.  5) Pt demonstrates increasing strength in postural stabilizers, deep neck flexors.     Plan  Therapy options: will be seen for skilled therapy services  Treatment plan discussed with: patient  Plan details: Resume PT weekly for 8 weeks, addressing pain, posture, flexibility, and strength of postural stabilizers.               Timed:  Manual Therapy:   10     mins  89085;  Therapeutic Exercise:   35      mins  52472;     Neuromuscular Mason:  15      mins  29316;    Therapeutic Activity:          mins  67198;     Gait Training:           mins  68324;     Ultrasound:          mins  25654;    Electrical Stimulation:         mins  00096 (  );    Untimed:  Electrical Stimulation:         mins  97752 ( );  Mechanical Traction:         mins  62691;     Timed Treatment:   60   mins   Total Treatment:     60   mins      Marina Subramanian PT  Physical Therapist

## 2022-11-14 ENCOUNTER — TELEPHONE (OUTPATIENT)
Dept: PHYSICAL THERAPY | Facility: CLINIC | Age: 42
End: 2022-11-14

## 2022-12-02 ENCOUNTER — LAB (OUTPATIENT)
Dept: LAB | Facility: HOSPITAL | Age: 42
End: 2022-12-02

## 2022-12-02 DIAGNOSIS — Z13.29 SCREENING FOR THYROID DISORDER: ICD-10-CM

## 2022-12-02 DIAGNOSIS — Z13.220 SCREENING FOR CHOLESTEROL LEVEL: ICD-10-CM

## 2022-12-02 DIAGNOSIS — Z00.00 PHYSICAL EXAM, ANNUAL: ICD-10-CM

## 2022-12-02 DIAGNOSIS — Z13.1 SCREENING FOR DIABETES MELLITUS: ICD-10-CM

## 2022-12-02 DIAGNOSIS — Z13.0 SCREENING FOR DEFICIENCY ANEMIA: ICD-10-CM

## 2022-12-02 LAB
ALBUMIN SERPL-MCNC: 4.2 G/DL (ref 3.5–5.2)
ALBUMIN/GLOB SERPL: 1.6 G/DL
ALP SERPL-CCNC: 55 U/L (ref 39–117)
ALT SERPL W P-5'-P-CCNC: 11 U/L (ref 1–33)
ANION GAP SERPL CALCULATED.3IONS-SCNC: 12.6 MMOL/L (ref 5–15)
AST SERPL-CCNC: 13 U/L (ref 1–32)
BASOPHILS # BLD AUTO: 0.06 10*3/MM3 (ref 0–0.2)
BASOPHILS NFR BLD AUTO: 1 % (ref 0–1.5)
BILIRUB SERPL-MCNC: 0.5 MG/DL (ref 0–1.2)
BUN SERPL-MCNC: 10 MG/DL (ref 6–20)
BUN/CREAT SERPL: 13.5 (ref 7–25)
CALCIUM SPEC-SCNC: 9.4 MG/DL (ref 8.6–10.5)
CHLORIDE SERPL-SCNC: 100 MMOL/L (ref 98–107)
CHOLEST SERPL-MCNC: 185 MG/DL (ref 0–200)
CO2 SERPL-SCNC: 26.4 MMOL/L (ref 22–29)
CREAT SERPL-MCNC: 0.74 MG/DL (ref 0.57–1)
DEPRECATED RDW RBC AUTO: 35.6 FL (ref 37–54)
EGFRCR SERPLBLD CKD-EPI 2021: 103.7 ML/MIN/1.73
EOSINOPHIL # BLD AUTO: 0.14 10*3/MM3 (ref 0–0.4)
EOSINOPHIL NFR BLD AUTO: 2.4 % (ref 0.3–6.2)
ERYTHROCYTE [DISTWIDTH] IN BLOOD BY AUTOMATED COUNT: 11.1 % (ref 12.3–15.4)
GLOBULIN UR ELPH-MCNC: 2.7 GM/DL
GLUCOSE SERPL-MCNC: 106 MG/DL (ref 65–99)
HCT VFR BLD AUTO: 35.8 % (ref 34–46.6)
HDLC SERPL-MCNC: 85 MG/DL (ref 40–60)
HGB BLD-MCNC: 12.5 G/DL (ref 12–15.9)
IMM GRANULOCYTES # BLD AUTO: 0.01 10*3/MM3 (ref 0–0.05)
IMM GRANULOCYTES NFR BLD AUTO: 0.2 % (ref 0–0.5)
LDLC SERPL CALC-MCNC: 82 MG/DL (ref 0–100)
LDLC/HDLC SERPL: 0.93 {RATIO}
LYMPHOCYTES # BLD AUTO: 1.58 10*3/MM3 (ref 0.7–3.1)
LYMPHOCYTES NFR BLD AUTO: 27.2 % (ref 19.6–45.3)
MCH RBC QN AUTO: 30.3 PG (ref 26.6–33)
MCHC RBC AUTO-ENTMCNC: 34.9 G/DL (ref 31.5–35.7)
MCV RBC AUTO: 86.9 FL (ref 79–97)
MONOCYTES # BLD AUTO: 0.47 10*3/MM3 (ref 0.1–0.9)
MONOCYTES NFR BLD AUTO: 8.1 % (ref 5–12)
NEUTROPHILS NFR BLD AUTO: 3.55 10*3/MM3 (ref 1.7–7)
NEUTROPHILS NFR BLD AUTO: 61.1 % (ref 42.7–76)
NRBC BLD AUTO-RTO: 0 /100 WBC (ref 0–0.2)
PLATELET # BLD AUTO: 294 10*3/MM3 (ref 140–450)
PMV BLD AUTO: 10.2 FL (ref 6–12)
POTASSIUM SERPL-SCNC: 4 MMOL/L (ref 3.5–5.2)
PROT SERPL-MCNC: 6.9 G/DL (ref 6–8.5)
RBC # BLD AUTO: 4.12 10*6/MM3 (ref 3.77–5.28)
SODIUM SERPL-SCNC: 139 MMOL/L (ref 136–145)
TRIGL SERPL-MCNC: 104 MG/DL (ref 0–150)
TSH SERPL DL<=0.05 MIU/L-ACNC: 2.6 UIU/ML (ref 0.27–4.2)
VLDLC SERPL-MCNC: 18 MG/DL (ref 5–40)
WBC NRBC COR # BLD: 5.81 10*3/MM3 (ref 3.4–10.8)

## 2022-12-02 PROCEDURE — 80050 GENERAL HEALTH PANEL: CPT

## 2022-12-02 PROCEDURE — 80061 LIPID PANEL: CPT

## 2022-12-08 ENCOUNTER — PATIENT MESSAGE (OUTPATIENT)
Dept: FAMILY MEDICINE CLINIC | Facility: CLINIC | Age: 42
End: 2022-12-08

## 2022-12-08 DIAGNOSIS — R00.2 PALPITATIONS: ICD-10-CM

## 2022-12-08 DIAGNOSIS — R79.89 ABNORMAL CBC: Primary | ICD-10-CM

## 2022-12-08 DIAGNOSIS — R42 DIZZINESS: ICD-10-CM

## 2022-12-22 ENCOUNTER — TREATMENT (OUTPATIENT)
Dept: PHYSICAL THERAPY | Facility: CLINIC | Age: 42
End: 2022-12-22

## 2022-12-22 DIAGNOSIS — M54.2 PAIN, NECK: Primary | ICD-10-CM

## 2022-12-22 PROCEDURE — 97112 NEUROMUSCULAR REEDUCATION: CPT | Performed by: PHYSICAL THERAPIST

## 2022-12-22 PROCEDURE — 97140 MANUAL THERAPY 1/> REGIONS: CPT | Performed by: PHYSICAL THERAPIST

## 2022-12-22 PROCEDURE — 97110 THERAPEUTIC EXERCISES: CPT | Performed by: PHYSICAL THERAPIST

## 2022-12-22 NOTE — PROGRESS NOTES
"          Physical Therapy Progress Note  [address], [address]  [place], [place], [address]        Patient: Candace Kinney   : 1980  Diagnosis/ICD-10 Code:  The encounter diagnosis was Pain, neck.   Referring practitioner: Edwin Cottrell MD  Date of Initial Visit: Type: THERAPY  Noted: 2022  Today's Date: 2022  Visit:  3     Candace Kinney reports: her neck is feeling better.  She is more aware of her posture and she tries to do her HEP regularly.  She has been dealing with the stress of a job change in recent weeks.  She will begin her new job at the beginning of the year.    Subjective     Treatment  Pre-treatment pain:  3  Post-treatment pain:  2    Exercise 1  Exercise Name 1: Brief reassessment  Exercise 2  Exercise Name 2: HEP reviewed and progressed  Exercise 3  Exercise Name 3: \"robbery\" scap squeeze-review-cues for lower trapezius activation  Exercise 4  Exercise Name 4: sitting posture-cues/practice to work toward neutral pelvis  Exercise 5  Exercise Name 5: supine PPT  Exercise 6  Exercise Name 6: supine chin tuck  Exercise 7  Exercise Name 7: abdominal crunch with chin tuck  Exercise 8  Exercise Name 8: postural practice with back to wall    Manual Rx 1  Manual Rx 1 Location: cervical spine  Manual Rx 1 Type: suboccipital release, OA flexion, passive cervical retraction    Functional Testing  Functional Tests Options:  (Pt needed to leave to get to work before completing assessment form.)   Objective     Assessment & Plan     Assessment    Assessment details: Pt indicates overall reduction in pain intensity and improving postural awareness.  She is working to improve work postures.  She is dealing with stress related to job change.  She is not able to attend treatment sessions regularly, but will benefit from ongoing intervention to progress postural stability exercises as tolerated.    Goals  Plan Goals: Goals  Plan Goals: Plan Goals: 4 weeks-ongoing   1) Pt " demonstrates independence and compliance with initial HEP-MET.  2) Pt reports reduction in intensity of neck pain to no worse than 3/10 on NPRS-progressing, ongoing, can occasionally be worse than 3/10.  3) Pt demonstrates improving postural alignment and awareness-progressing, ongoing.  4) Pt demonstrates CROM without increase in pain-partially met, progressing.    8 weeks-ongoing  1) Pt demonstrates independence in advanced HEP and self-management strategies to prevent/reduce symptom recurrence.  2) Pt reports pain is not constant, is no worse than 2/10, and is relieved by performance of prescribed HEP.  3) CROM is WNL's without increase in pain, and not limiting to functional abilities.  4) NDI score is improved to <20%, indicating improving functional abilities.  5) Pt demonstrates increasing strength in postural stabilizers, deep neck flexors.     Plan  Therapy options: will be seen for skilled therapy services  Plan details: Follow up in about 3 weeks per pt request due to her job change.               Timed:  Manual Therapy:    10     mins  69088;  Therapeutic Exercise:    20     mins  14286;     Neuromuscular Mason:    15    mins  35969;    Therapeutic Activity:          mins  75152;     Gait Training:           mins  82103;     Ultrasound:          mins  65467;    Electrical Stimulation:         mins  69236 ( );    Untimed:  Electrical Stimulation:         mins  57186 ( );  Mechanical Traction:         mins  85288;     Timed Treatment:   45   mins   Total Treatment:     45   mins      Marina Subramanian PT  Physical Therapist

## 2023-01-23 ENCOUNTER — DOCUMENTATION (OUTPATIENT)
Dept: PHYSICAL THERAPY | Facility: CLINIC | Age: 43
End: 2023-01-23
Payer: COMMERCIAL

## 2023-01-23 NOTE — PROGRESS NOTES
Discharge Summary  Discharge Summary from Physical Therapy Report      Patient: Candace Kinney   : 1980  Diagnosis/ICD-10 Code:  There were no encounter diagnoses.   Referring practitioner: No ref. provider found  Date of Initial Visit: No linked episodes  Today's Date: 2023  Date of Last Visit: 22     Number of Visits: 3    Discharge Status of Patient:     Goals: Partially Met    Discharge Plan: Continue with current home exercise program as instructed    Comments:  Pt has been unable to attend regularly, but has been working on exercises at home.    Date of Discharge: 2023        Marina Subramanian, PT

## 2023-05-10 NOTE — PROGRESS NOTES
Subjective   Chief Complaint   Patient presents with   • Annual Exam     Well woman exam     Candace Kinney is a 43 y.o. year old  presenting to be seen for her annual exam.   Her last pap was 2022 with normal cytology and negative HPV cotesting.   Mammogram 2021.   She would like to have prescription for diflucan for recurrent yeast infections, she denies symptoms now however does have reoccurring infections.    SEXUAL Hx:  She is currently sexually active.  In the past year there there has been NO new sexual partners.    Condoms are never used.  She would not like to be screened for STD's at today's exam.  Current birth control method: OCP (estrogen/progesterone).  She is happy with her current method of contraception and does want to discuss alternative methods of contraception. Considering mirena placement.   MENSTRUAL Hx:  Patient's last menstrual period was 04/15/2023 (approximate).  In the past 6 months her cycles have been regular, predictable and occur monthly.  Her menstrual flow is typically normal.   Each month on average there are roughly 0 day(s) of very heavy flow.    Intermenstrual bleeding is absent.    Post-coital bleeding is absent.  Dysmenorrhea: is not affecting her activities of daily living  PMS: is not affecting her activities of daily living  Her cycles are not a source of concern for her that she wishes to discuss today.  HEALTH Hx:  She exercises regularly: yes.  She wears her seat belt: yes.  She has concerns about domestic violence: no.  OTHER THINGS SHE WANTS TO DISCUSS TODAY:  Nothing else    The following portions of the patient's history were reviewed and updated as appropriate:problem list, current medications, allergies, past family history, past medical history, past social history and past surgical history.    Social History    Tobacco Use      Smoking status: Never      Smokeless tobacco: Never      Review of Systems  Constitutional POS: nothing reported    NEG:  "anorexia or night sweats   Genitourinary     NEG: dysuria or hematuria      Gastointestinal POS: nothing reported    NEG: bloating, change in bowel habits, melena or reflux symptoms   Integument POS: nothing reported    NEG: moles that are changing in size, shape, color or rashes   Breast POS: nothing reported    NEG: persistent breast lump, skin dimpling or nipple discharge        Objective   BP 90/60 (BP Location: Left arm, Patient Position: Sitting, Cuff Size: Adult)   Ht 165.1 cm (65\")   Wt 62.1 kg (137 lb)   LMP 04/15/2023 (Approximate)   BMI 22.80 kg/m²     General:  well developed; well nourished  no acute distress   Skin:  No suspicious lesions seen   Thyroid: normal to inspection and palpation   Breasts:  Examined in supine position  Symmetric without masses or skin dimpling  Nipples normal without inversion, lesions or discharge  There are no palpable axillary nodes  dense breast tissue noted bilaterally   Abdomen: soft, non-tender; no masses  no umbilical or inguinal hernias are present  no hepato-splenomegaly   Pelvis: Clinical staff was present for exam  :  urethral meatus normal;  Vaginal:  normal pink mucosa without prolapse or lesions.  Cervix:  normal appearance.  Uterus:  normal size, shape and consistency.  Adnexa:  normal bimanual exam of the adnexa.  Rectal:  digital rectal exam not performed; anus visually normal appearing.        Assessment   1. Well woman with routine gynecological exam  2. Contraception management  3.  due For screening mammogram with last exam in 2021.     Plan     1. Pap was done today.  If she does not receive the results of the Pap within 2 weeks  time, she was instructed to call to find out the results.   2. Discussed Mirena IUD.  Brochure given.  Discussed mechanism of action of Mirena IUD and bleeding profile.  3. Prescription(s) for OCP's were refilled today    4. Prescription for Diflucan with refill sent to pharmacy on file.  If no improvement in symptoms " with Diflucan use discussed with patient need to return to care for 1 swab  5. She was encouraged to get yearly mammograms.  She should report any palpable breast lump(s) or skin changes regardless of mammographic findings.  I explained to Candace that notification regarding her mammogram results will come from the center performing the study.  Our office will not be routinely calling with mammogram results.  It is her responsibility to make sure that the results from the mammogram are communicated to her by the breast center.  If she has any questions about the results, she is welcome to call our office anytime.  6. The importance of keeping all planned follow-up and taking all medications as prescribed was emphasized.  7. Today I discussed with Candace the total recommended calcium intake for a premenopausal female is 1000 mg.  Ideally this should be from dietary sources.  I reviewed calcium content in various foods including milk, fortified orange juice and yogurt.  If she cannot get sufficient calcium through dietary means, it is recommended to supplement with either a multivitamin or calcium to reach her daily goal.  I also reviewed the difference in the bioavailability of calcium carbonate and calcium citrate containing supplements and the importance of taking calcium carbonate containing products with food.  Finally, vitamin D's role in calcium absorption was reviewed and a total daily vitamin D intake of 800 units was recommended.  8. Follow up for annual exam 1 year or for Mirena insertion if she would like to change contraceptive methods.     New Medications Ordered This Visit   Medications   • fluconazole (Diflucan) 150 MG tablet     Sig: Take 1 tablet by mouth Daily.     Dispense:  1 tablet     Refill:  3   • drospirenone-ethinyl estradiol (Elvia) 3-0.02 MG per tablet     Sig: Take 1 tablet by mouth Daily.     Dispense:  84 tablet     Refill:  4          This note was electronically signed.    Linda  Fernando, APRN  May 12, 2023

## 2023-05-12 ENCOUNTER — OFFICE VISIT (OUTPATIENT)
Dept: OBSTETRICS AND GYNECOLOGY | Facility: CLINIC | Age: 43
End: 2023-05-12
Payer: COMMERCIAL

## 2023-05-12 VITALS
SYSTOLIC BLOOD PRESSURE: 90 MMHG | HEIGHT: 65 IN | BODY MASS INDEX: 22.82 KG/M2 | WEIGHT: 137 LBS | DIASTOLIC BLOOD PRESSURE: 60 MMHG

## 2023-05-12 DIAGNOSIS — B37.9 YEAST INFECTION: ICD-10-CM

## 2023-05-12 DIAGNOSIS — Z12.31 SCREENING MAMMOGRAM FOR BREAST CANCER: ICD-10-CM

## 2023-05-12 DIAGNOSIS — Z30.41 ENCOUNTER FOR SURVEILLANCE OF CONTRACEPTIVE PILLS: ICD-10-CM

## 2023-05-12 DIAGNOSIS — Z01.419 WELL WOMAN EXAM WITH ROUTINE GYNECOLOGICAL EXAM: Primary | ICD-10-CM

## 2023-05-12 RX ORDER — INSULIN GLARGINE 100 [IU]/ML
INJECTION, SOLUTION SUBCUTANEOUS
COMMUNITY
Start: 2023-03-09 | End: 2023-05-12

## 2023-05-12 RX ORDER — DROSPIRENONE AND ETHINYL ESTRADIOL 0.02-3(28)
1 KIT ORAL DAILY
Qty: 84 TABLET | Refills: 4 | Status: SHIPPED | OUTPATIENT
Start: 2023-05-12

## 2023-05-12 RX ORDER — FLUCONAZOLE 150 MG/1
150 TABLET ORAL DAILY
Qty: 1 TABLET | Refills: 3 | Status: SHIPPED | OUTPATIENT
Start: 2023-05-12

## 2023-05-15 LAB — REF LAB TEST METHOD: NORMAL

## 2023-09-01 ENCOUNTER — HOSPITAL ENCOUNTER (OUTPATIENT)
Dept: MAMMOGRAPHY | Facility: HOSPITAL | Age: 43
Discharge: HOME | End: 2023-09-01
Admitting: NURSE PRACTITIONER
Payer: COMMERCIAL

## 2023-09-01 DIAGNOSIS — Z12.31 SCREENING MAMMOGRAM FOR BREAST CANCER: ICD-10-CM

## 2023-09-01 PROCEDURE — 77067 SCR MAMMO BI INCL CAD: CPT

## 2023-09-01 PROCEDURE — 77063 BREAST TOMOSYNTHESIS BI: CPT

## 2023-10-03 ENCOUNTER — HOSPITAL ENCOUNTER (OUTPATIENT)
Dept: ULTRASOUND IMAGING | Facility: HOSPITAL | Age: 43
Discharge: HOME | End: 2023-10-03
Payer: COMMERCIAL

## 2023-10-03 ENCOUNTER — HOSPITAL ENCOUNTER (OUTPATIENT)
Dept: MAMMOGRAPHY | Facility: HOSPITAL | Age: 43
Discharge: HOME | End: 2023-10-03
Payer: COMMERCIAL

## 2023-10-03 DIAGNOSIS — R92.8 ABNORMAL MAMMOGRAM: ICD-10-CM

## 2023-10-03 PROCEDURE — G0279 TOMOSYNTHESIS, MAMMO: HCPCS

## 2023-10-03 PROCEDURE — 77065 DX MAMMO INCL CAD UNI: CPT | Performed by: RADIOLOGY

## 2023-10-03 PROCEDURE — 77061 BREAST TOMOSYNTHESIS UNI: CPT | Performed by: RADIOLOGY

## 2023-10-03 PROCEDURE — 77065 DX MAMMO INCL CAD UNI: CPT

## 2023-10-03 PROCEDURE — 76642 ULTRASOUND BREAST LIMITED: CPT

## 2023-10-03 PROCEDURE — 76642 ULTRASOUND BREAST LIMITED: CPT | Performed by: RADIOLOGY

## 2023-10-27 ENCOUNTER — OFFICE VISIT (OUTPATIENT)
Dept: FAMILY MEDICINE CLINIC | Facility: CLINIC | Age: 43
End: 2023-10-27
Payer: COMMERCIAL

## 2023-10-27 VITALS
SYSTOLIC BLOOD PRESSURE: 120 MMHG | HEIGHT: 65 IN | BODY MASS INDEX: 22.29 KG/M2 | WEIGHT: 133.8 LBS | RESPIRATION RATE: 14 BRPM | DIASTOLIC BLOOD PRESSURE: 78 MMHG | HEART RATE: 82 BPM | OXYGEN SATURATION: 98 %

## 2023-10-27 DIAGNOSIS — Z00.00 PHYSICAL EXAM, ANNUAL: Primary | ICD-10-CM

## 2023-10-27 DIAGNOSIS — N87.0 MILD DYSPLASIA OF CERVIX (CIN I): ICD-10-CM

## 2023-10-27 DIAGNOSIS — M32.9 LUPUS: ICD-10-CM

## 2023-10-27 DIAGNOSIS — Z12.83 SKIN EXAM, SCREENING FOR CANCER: ICD-10-CM

## 2023-10-27 DIAGNOSIS — E13.9 LADA (LATENT AUTOIMMUNE DIABETES IN ADULTS), MANAGED AS TYPE 1: ICD-10-CM

## 2023-10-27 DIAGNOSIS — R29.898 SHOULDER WEAKNESS: ICD-10-CM

## 2023-10-27 NOTE — PROGRESS NOTES
Chief Complaint   Patient presents with    Annual Exam     Physical    Shoulder Pain     Frozen shoulder       Candace Kinney is a very pleasant 43 y.o. female who is here for annual physical exam.  Patient is doing well overall.  She had an episode where her left shoulder was weak and range of motion was diminished.  This only lasted a day or less and she is back to normal now.  No trauma/injury.    Patient is followed by endocrinology, rheumatology and gynecology.  She requests referral to dermatology.  Going to gynecologist yearly and up-to-date on pap smear and mammogram.  Reviewed vaccinations, already had flu shot.    Past Medical History:   Diagnosis Date    Abnormal Pap smear of cervix     Anemia     Arthritis     Depression     Diabetes mellitus     Female bladder prolapse     HPV (human papilloma virus) infection     Hyperglycemia     Lupus     Migraine        Past Surgical History:   Procedure Laterality Date    HERNIA REPAIR      TOOTH EXTRACTION      UMBILICAL HERNIA REPAIR  2012    WISDOM TOOTH EXTRACTION  2001       Family History   Problem Relation Age of Onset    Cancer Mother     Arthritis Mother     Lymphoma Mother     Osteoporosis Mother     Mental illness Father     Migraines Father     Hypertension Father     Pulmonary embolism Father     Tiff Parkinson White syndrome Father     Deep vein thrombosis Father     Mental illness Sister     Obesity Sister     No Known Problems Brother     Arthritis Maternal Grandmother     Liver cancer Maternal Grandmother     No Known Problems Maternal Grandfather     Lung cancer Paternal Grandmother         smoker    Stroke Paternal Grandfather     Hypertension Other     Obesity Other     Migraines Other     Breast cancer Neg Hx     Colon cancer Neg Hx     Ovarian cancer Neg Hx     Uterine cancer Neg Hx     Endometrial cancer Neg Hx        Social History     Socioeconomic History    Marital status: Single    Number of children: 2    Highest education level:  "Master's degree (e.g., MA, MS, Luis Miguel, MEd, MSW, IV[employer])   Tobacco Use    Smoking status: Never    Smokeless tobacco: Never   Vaping Use    Vaping Use: Never used   Substance and Sexual Activity    Alcohol use: Yes     Alcohol/week: 2.0 standard drinks of alcohol     Types: 2 Glasses of wine per week     Comment: occasional    Drug use: Never     Types: Marijuana    Sexual activity: Yes     Partners: Male     Birth control/protection: Birth control pill       Allergies   Allergen Reactions    Macrobid [Nitrofurantoin Macrocrystal] Headache    Nitrofurantoin Hives    Sulfa Antibiotics Hives       ROS  Review of Systems   Constitutional:  Negative for chills, diaphoresis, fatigue and fever.   HENT:  Negative for congestion, ear pain, hearing loss, postnasal drip, rhinorrhea and sore throat.    Eyes:  Negative for blurred vision and pain.   Respiratory:  Negative for cough, shortness of breath and wheezing.    Cardiovascular:  Negative for chest pain and leg swelling.   Gastrointestinal:  Negative for abdominal pain, blood in stool, constipation, diarrhea, nausea, vomiting and indigestion.   Endocrine: Negative for polyuria.   Genitourinary:  Negative for dysuria, flank pain and hematuria.   Musculoskeletal:  Negative for arthralgias, gait problem and myalgias.   Skin:  Negative for rash and skin lesions.   Neurological:  Negative for dizziness and headache.   Psychiatric/Behavioral:  Negative for self-injury, sleep disturbance, suicidal ideas, depressed mood and stress. The patient is not nervous/anxious.        Vitals:    10/27/23 1141   BP: 120/78   BP Location: Left arm   Patient Position: Sitting   Cuff Size: Adult   Pulse: 82   Resp: 14   SpO2: 98%   Weight: 60.7 kg (133 lb 12.8 oz)   Height: 165.1 cm (65\")   PainSc:   4   PainLoc: Shoulder     Body mass index is 22.27 kg/m².    BMI is within normal parameters. No other follow-up for BMI required.       Current Outpatient Medications on File Prior to Visit "   Medication Sig Dispense Refill    B Complex Vitamins (B-COMPLEX/B-12 SL) Place  under the tongue.      B-D UF III MINI PEN NEEDLES 31G X 5 MM misc       Continuous Blood Gluc  (Dexcom G6 ) device       Continuous Blood Gluc Sensor (Dexcom G6 Sensor)       Continuous Blood Gluc Transmit (Dexcom G6 Transmitter) misc       drospirenone-ethinyl estradiol (Elvia) 3-0.02 MG per tablet Take 1 tablet by mouth Daily. 84 tablet 4    fluconazole (Diflucan) 150 MG tablet Take 1 tablet by mouth Daily. 1 tablet 3    multivitamin with minerals tablet tablet Take 1 tablet by mouth Daily.       No current facility-administered medications on file prior to visit.       Results for orders placed or performed in visit on 23   LIQUID-BASED PAP SMEAR WITH HPV GENOTYPING REGARDLESS OF INTERPRETATION ([facility])    Specimen: Cervix, Endocervix; ThinPrep Vial   Result Value Ref Range    Reference Lab Report       [facility]  [address] [place] [address]  Phone: [phone] or [phone]  Fax: [phone]  Tomi Escobedo M.D., Medical Director    PATIENT NAME                                     LABORATORY NO.  [id number]   FRANCO ELIZALDE                           [id number]  [id number]                                 AGE                    SEX   SSN              CLIENT REF #  [facility]                       43        1980      F     [id number]      [id number]    ANGÉLICA GUERRERO                       REQUESTING MCHELI           ATTENDING M.D.         COPY TO.  [address], [facility] [address]            [place] [address]                        DATE COLLECTED            DATE RECEIVED          DATE REPORTED  2023                2023             05/15/2023    ThinPrep Pap with [facility]    DIAGNOSIS:  Negative for intraepithelial lesion or malignancy    Multiple factors can influence accuracy of Pap  tests; therefore,  screening at  regular intervals is necessary for early cancer detection.      SPECIMEN ADEQUACY:  SATISFACTORY FOR EVALUATION  Transformation zone is present.  SOURCE OF SPECIMEN:       CERVICAL/ENDOCERVICAL  SLIDES:  1  CLINICAL HISTORY:  Well woman exam with routine gynecological exam    HPV  HR-HPV POOL: Negative    The Aptima HPV assay is an in vitro nucleic acid amplification test for the  qualitative detection of E6/E7 viral messenger RNA from 14 high risk types of  HPV in cervical specimens. The high risk HPV types detected include: 16, 18,  31, 33, 35, 39, 45, 51, 52, 56, 58, 59, 66, 68    CYTOTECHNOLOGIST:             VINAYAK MELARA(ASCP)    CPT CODES:  29263, 60472         PE    BMI is within normal parameters. No other follow-up for BMI required.      Physical Exam  Vitals reviewed.   Constitutional:       General: She is not in acute distress.     Appearance: Normal appearance. She is well-developed and normal weight. She is not ill-appearing or diaphoretic.   HENT:      Head: Normocephalic and atraumatic.      Right Ear: Hearing, tympanic membrane, ear canal and external ear normal.      Left Ear: Hearing, tympanic membrane, ear canal and external ear normal.      Nose: Nose normal.      Right Sinus: No maxillary sinus tenderness or frontal sinus tenderness.      Left Sinus: No maxillary sinus tenderness or frontal sinus tenderness.      Mouth/Throat:      Pharynx: Uvula midline.   Eyes:      General: Lids are normal.      Extraocular Movements: Extraocular movements intact.      Conjunctiva/sclera: Conjunctivae normal.   Neck:      Thyroid: No thyroid mass or thyromegaly.      Trachea: Trachea and phonation normal.   Cardiovascular:      Rate and Rhythm: Normal rate and regular rhythm.      Heart sounds: Normal heart sounds.   Pulmonary:      Effort: Pulmonary effort is normal.      Breath sounds: Normal breath sounds.   Abdominal:      General: Bowel sounds are normal. There is no distension.       Palpations: Abdomen is soft. Abdomen is not rigid.      Tenderness: There is no abdominal tenderness. There is no guarding.   Musculoskeletal:         General: Normal range of motion.      Cervical back: Normal range of motion.      Right lower leg: No edema.      Left lower leg: No edema.   Lymphadenopathy:      Cervical: No cervical adenopathy.      Right cervical: No superficial cervical adenopathy.     Left cervical: No superficial cervical adenopathy.   Skin:     General: Skin is warm.      Findings: No erythema or rash.      Nails: There is no clubbing.   Neurological:      Mental Status: She is alert and oriented to person, place, and time.      Coordination: Coordination normal.      Gait: Gait normal.      Deep Tendon Reflexes: Reflexes are normal and symmetric.      Comments: CN grossly intact   Psychiatric:         Attention and Perception: Attention and perception normal. She is attentive.         Mood and Affect: Mood and affect normal.         Speech: Speech normal.         Behavior: Behavior normal. Behavior is cooperative.         Thought Content: Thought content normal.         Cognition and Memory: Cognition and memory normal.         Judgment: Judgment normal.         A/P    Diagnoses and all orders for this visit:    1. Physical exam, annual (Primary)  PE completed  Preventative labs ordered  Mammogram is up-to-date  Gynecologist - established, goes yearly  Dentist - encouraged to go regularly  Ophthalmologist - encouraged to go regularly  Dermatologist - referral placed  Vaccinations discussed    2. 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    3. Lupus  Followed by rheumatology.    4. Mild dysplasia of cervix (JEANNINE I) -- needs repeat pap smear with co-testing in 5/2022  Established with gynecology.    5. Shoulder weakness  Occurred in left shoulder.  No trauma/injury.  Back to normal now.  Will call if symptoms occur again.  Would probably  recommend PT as first-line treatment.    6. Skin exam, screening for cancer  -     Ambulatory Referral to Dermatology         Plan of care reviewed with patient at the conclusion of today's visit. Education was provided regarding nutrition , exercise, supplements, preventative screenings, and vaccinations diagnosis, management and any prescribed or recommended OTC medications.  Patient verbalizes understanding of and agreement with management plan.    Dictated Utilizing Dragon Dictation     Please note that portions of this note were completed with a voice recognition program.     Part of this note may be an electronic transcription/translation of spoken language to printed text using the Dragon Dictation System.    Return in about 1 year (around 10/27/2024) for Annual physical.     Danelle Barraza PA-C

## 2023-10-31 ENCOUNTER — TELEPHONE (OUTPATIENT)
Dept: FAMILY MEDICINE CLINIC | Facility: CLINIC | Age: 43
End: 2023-10-31
Payer: COMMERCIAL

## 2023-10-31 NOTE — TELEPHONE ENCOUNTER
Called pt and left v/m letting her know [facility] has her scheduled for 2/21/24 at 10:55am at [address] [address]. Their phone number to call and reschedule is [phone]. RELAY.

## 2023-12-31 PROCEDURE — 87077 CULTURE AEROBIC IDENTIFY: CPT

## 2023-12-31 PROCEDURE — 87186 SC STD MICRODIL/AGAR DIL: CPT

## 2023-12-31 PROCEDURE — 87086 URINE CULTURE/COLONY COUNT: CPT

## 2024-05-16 DIAGNOSIS — Z30.41 ENCOUNTER FOR SURVEILLANCE OF CONTRACEPTIVE PILLS: ICD-10-CM

## 2024-05-16 RX ORDER — DROSPIRENONE AND ETHINYL ESTRADIOL 0.02-3(28)
1 KIT ORAL DAILY
Qty: 84 TABLET | Refills: 4 | OUTPATIENT
Start: 2024-05-16

## 2024-05-24 ENCOUNTER — OFFICE VISIT (OUTPATIENT)
Dept: FAMILY MEDICINE CLINIC | Facility: CLINIC | Age: 44
End: 2024-05-24
Payer: COMMERCIAL

## 2024-05-24 VITALS
WEIGHT: 135.6 LBS | BODY MASS INDEX: 21.79 KG/M2 | OXYGEN SATURATION: 99 % | TEMPERATURE: 97.3 F | HEART RATE: 75 BPM | DIASTOLIC BLOOD PRESSURE: 82 MMHG | SYSTOLIC BLOOD PRESSURE: 98 MMHG | HEIGHT: 66 IN

## 2024-05-24 DIAGNOSIS — R35.0 URINARY FREQUENCY: Primary | ICD-10-CM

## 2024-05-24 DIAGNOSIS — M54.9 UPPER BACK PAIN: ICD-10-CM

## 2024-05-24 DIAGNOSIS — Z30.41 ENCOUNTER FOR SURVEILLANCE OF CONTRACEPTIVE PILLS: ICD-10-CM

## 2024-05-24 PROBLEM — Z78.9 USES ORAL CONTRACEPTIVES AS PRIMARY BIRTH CONTROL METHOD: Status: ACTIVE | Noted: 2023-07-14

## 2024-05-24 PROBLEM — Z79.4 LONG TERM (CURRENT) USE OF INSULIN: Status: ACTIVE | Noted: 2023-07-14

## 2024-05-24 LAB
BILIRUB BLD-MCNC: NEGATIVE MG/DL
CLARITY, POC: CLEAR
COLOR UR: YELLOW
EXPIRATION DATE: NORMAL
GLUCOSE UR STRIP-MCNC: NEGATIVE MG/DL
KETONES UR QL: NEGATIVE
LEUKOCYTE EST, POC: NEGATIVE
Lab: NORMAL
NITRITE UR-MCNC: NEGATIVE MG/ML
PH UR: 7 [PH] (ref 5–8)
PROT UR STRIP-MCNC: NEGATIVE MG/DL
RBC # UR STRIP: NEGATIVE /UL
SP GR UR: 1.01 (ref 1–1.03)
UROBILINOGEN UR QL: NORMAL

## 2024-05-24 PROCEDURE — 99214 OFFICE O/P EST MOD 30 MIN: CPT | Performed by: PHYSICIAN ASSISTANT

## 2024-05-24 PROCEDURE — 81003 URINALYSIS AUTO W/O SCOPE: CPT | Performed by: PHYSICIAN ASSISTANT

## 2024-05-24 RX ORDER — FLUTICASONE PROPIONATE 50 MCG
1 SPRAY, SUSPENSION (ML) NASAL DAILY
COMMUNITY

## 2024-05-24 RX ORDER — DROSPIRENONE AND ETHINYL ESTRADIOL 0.02-3(28)
1 KIT ORAL DAILY
Qty: 84 TABLET | Refills: 4 | Status: SHIPPED | OUTPATIENT
Start: 2024-05-24

## 2024-05-24 RX ORDER — INSULIN GLARGINE 100 [IU]/ML
13 INJECTION, SOLUTION SUBCUTANEOUS DAILY
COMMUNITY
Start: 2024-05-10 | End: 2025-05-10

## 2024-05-24 NOTE — PROGRESS NOTES
Chief Complaint   Patient presents with    Back Pain    Abdominal Cramping    Urinary Frequency         Candace Kinney is a 44 y.o. female who presents for Back Pain, Abdominal Cramping, and Urinary Frequency    Patient reports urinary frequency for the last 3 weeks.  She is unsure if it is an infection or her diabetes.    She also reports episodic upper cervical pain.  She admits to poor posture.  No pain into her arms.    She recently got engaged and is moving to [facility].    Past Medical History:   Diagnosis Date    Abnormal Pap smear of cervix     Anemia     Arthritis     Depression     Diabetes mellitus     Female bladder prolapse     HPV (human papilloma virus) infection     Hyperglycemia     Lupus     Migraine        Past Surgical History:   Procedure Laterality Date    HERNIA REPAIR      TOOTH EXTRACTION      UMBILICAL HERNIA REPAIR  2012    WISDOM TOOTH EXTRACTION  2001       Family History   Problem Relation Age of Onset    Cancer Mother     Arthritis Mother     Lymphoma Mother     Osteoporosis Mother     Mental illness Father     Migraines Father     Hypertension Father     Pulmonary embolism Father     Itff Parkinson White syndrome Father     Deep vein thrombosis Father     Mental illness Sister     Obesity Sister     No Known Problems Brother     Arthritis Maternal Grandmother     Liver cancer Maternal Grandmother     No Known Problems Maternal Grandfather     Lung cancer Paternal Grandmother         smoker    Stroke Paternal Grandfather     Hypertension Other     Obesity Other     Migraines Other     Breast cancer Neg Hx     Colon cancer Neg Hx     Ovarian cancer Neg Hx     Uterine cancer Neg Hx     Endometrial cancer Neg Hx        Social History     Socioeconomic History    Marital status: Single    Number of children: 2    Highest education level: Master's degree (e.g., MA, MS, [employer], MEd, MSW, [employer])   Tobacco Use    Smoking status: Never    Smokeless tobacco: Never   Vaping Use     "Vaping status: Never Used   Substance and Sexual Activity    Alcohol use: Yes     Alcohol/week: 2.0 standard drinks of alcohol     Types: 2 Glasses of wine per week     Comment: occasional    Drug use: Never     Types: Marijuana    Sexual activity: Yes     Partners: Male     Birth control/protection: Birth control pill       Allergies   Allergen Reactions    Macrobid [Nitrofurantoin Macrocrystal] Headache    Nitrofurantoin Hives     Says not allergic     Sulfa Antibiotics Hives       ROS    Review of Systems   Constitutional:  Negative for chills, fatigue and fever.   Genitourinary:  Positive for frequency. Negative for flank pain.   Musculoskeletal:  Positive for back pain.       Vitals:    05/24/24 1420   BP: 98/82   Pulse: 75   Temp: 97.3 °F (36.3 °C)   TempSrc: Infrared   SpO2: 99%   Weight: 61.5 kg (135 lb 9.6 oz)   Height: 167.6 cm (65.98\")   PainSc:   2   PainLoc: Shoulder  Comment: left     Body mass index is 21.9 kg/m².    Current Outpatient Medications on File Prior to Visit   Medication Sig Dispense Refill    B Complex Vitamins (B-COMPLEX/B-12 SL) Place  under the tongue.      B-D UF III MINI PEN NEEDLES 31G X 5 MM misc       Continuous Blood Gluc  (Dexcom G6 ) device       Continuous Blood Gluc Sensor (Dexcom G6 Sensor)       Continuous Blood Gluc Transmit (Dexcom G6 Transmitter) misc       fluticasone (FLONASE) 50 MCG/ACT nasal spray 1 spray into the nostril(s) as directed by provider Daily.      Insulin Glargine Solostar 100 UNIT/ML solution pen-injector Inject 13 Units under the skin into the appropriate area as directed Daily.      Insulin Lispro, 1 Unit Dial, (HUMALOG) 100 UNIT/ML solution pen-injector       multivitamin with minerals tablet tablet Take 1 tablet by mouth Daily.      [DISCONTINUED] drospirenone-ethinyl estradiol (Elvia) 3-0.02 MG per tablet Take 1 tablet by mouth Daily. 84 tablet 4    [DISCONTINUED] fluconazole (Diflucan) 150 MG tablet Take 1 tablet by mouth Daily. " (Patient not taking: Reported on 5/24/2024) 1 tablet 3     No current facility-administered medications on file prior to visit.       Results for orders placed or performed during the hospital encounter of 12/31/23   Urine Culture - Urine, Urine, Clean Catch    Specimen: Urine, Clean Catch   Result Value Ref Range    Urine Culture >100,000 CFU/mL Escherichia coli (A)        Susceptibility    Escherichia coli - NICK     Amoxicillin + Clavulanate  Susceptible ug/ml     Ampicillin  Susceptible ug/ml     Ampicillin + Sulbactam  Susceptible ug/ml     Cefazolin  Susceptible ug/ml     Cefepime  Susceptible ug/ml     Ceftazidime  Susceptible ug/ml     Ceftriaxone  Susceptible ug/ml     Gentamicin  Susceptible ug/ml     Levofloxacin  Susceptible ug/ml     Nitrofurantoin  Susceptible ug/ml     Piperacillin + Tazobactam  Susceptible ug/ml     Trimethoprim + Sulfamethoxazole  Susceptible ug/ml   BV/YEAST/STI PANEL, LEXAR - Swab, Vagina    Specimen: Vagina; Swab   Result Value Ref Range    Candida Albicans, DORITA Not Detected Not Detected    Chlamydia Trachomatis DNA, SDA Not Detected Not Detected    Gardnerella vaginosis Not Detected Not Detected    Neisseria gonorrhoeae PCR Not Detected Not Detected    Trich vag by DORITA Not Detected Not Detected    Atopobium Vaginae Not Detected Not Detected    BVAB 2 Not Detected Not Detected    Hortensia Glabrata, DORITA Not Detected Not Detected    Candida parapsilosis, DORITA Not Detected Not Detected    Megasphaera 1 Not Detected Not Detected   POC Urinalysis Dipstick, Multipro (Automated Dipstick)    Specimen: Urine   Result Value Ref Range    Color Orange (A)     Clarity, UA Slightly Cloudy (A)     Glucose, UA 1+ (A) mg/dL    Bilirubin Large (3+) (A)     Ketones, UA 1+ (A)     Specific Gravity  1.015 1.005 - 1.030    Blood, UA 3+ (A)     pH, Urine 5.0 5.0 - 8.0    Protein, POC 1+ (A) mg/dL    Urobilinogen, UA 8 E.U./dL (A)     Nitrite, UA Positive (A)     Leukocytes Large (3+) (A)         PE    Physical Exam  Vitals reviewed.   Constitutional:       General: She is not in acute distress.     Appearance: Normal appearance. She is well-developed and normal weight. She is not ill-appearing or diaphoretic.   HENT:      Head: Normocephalic and atraumatic.   Eyes:      Extraocular Movements: Extraocular movements intact.      Conjunctiva/sclera: Conjunctivae normal.   Pulmonary:      Effort: No respiratory distress.   Musculoskeletal:         General: Normal range of motion.      Cervical back: Normal range of motion.        Back:    Neurological:      General: No focal deficit present.      Mental Status: She is alert.   Psychiatric:         Attention and Perception: She is attentive.         Mood and Affect: Mood normal.         Speech: Speech normal.         Behavior: Behavior normal. Behavior is cooperative.         Thought Content: Thought content normal.         Judgment: Judgment normal.          A/P    Diagnoses and all orders for this visit:    1. Urinary frequency (Primary)  -     POC Urinalysis Dipstick, Automated  Urinalysis is unremarkable.  No signs of infection.    2. Upper back pain  Suspect postural pain.  Recommend yoga, back exercises/stretching and working on posture.  Heating pad, tylenol and ibuprofen as needed.  If pain persists, would recommend referral to PT.    3. Encounter for surveillance of contraceptive pills  -     drospirenone-ethinyl estradiol (Elvia) 3-0.02 MG per tablet; Take 1 tablet by mouth Daily.  Dispense: 84 tablet; Refill: 4  Patient is moving to [facility] and will establish with PCP/gynecologist there.  Her pap smear was normal and is up-to-date.  Doing well on medication.  Aware she needs mammogram in October 2024.       Plan of care reviewed with patient at the conclusion of today's visit. Education was provided regarding diagnosis, management and any prescribed or recommended OTC medications.  Patient verbalizes understanding of and agreement with  management plan.    Dictated Utilizing Dragon Dictation     Please note that portions of this note were completed with a voice recognition program.     Part of this note may be an electronic transcription/translation of spoken language to printed text using the Dragon Dictation System.    No follow-ups on file.     Danelle Barraza PA-C